# Patient Record
Sex: FEMALE | Race: WHITE | Employment: FULL TIME | URBAN - METROPOLITAN AREA
[De-identification: names, ages, dates, MRNs, and addresses within clinical notes are randomized per-mention and may not be internally consistent; named-entity substitution may affect disease eponyms.]

---

## 2020-04-16 ENCOUNTER — HOSPITAL ENCOUNTER (EMERGENCY)
Age: 30
Discharge: HOME | End: 2020-04-16
Payer: MEDICAID

## 2020-04-16 ENCOUNTER — APPOINTMENT (OUTPATIENT)
Dept: CT IMAGING | Age: 30
End: 2020-04-16
Payer: MEDICAID

## 2020-04-16 ENCOUNTER — APPOINTMENT (OUTPATIENT)
Dept: GENERAL RADIOLOGY | Age: 30
End: 2020-04-16
Payer: MEDICAID

## 2020-04-16 VITALS
DIASTOLIC BLOOD PRESSURE: 75 MMHG | OXYGEN SATURATION: 100 % | TEMPERATURE: 98.2 F | WEIGHT: 134 LBS | SYSTOLIC BLOOD PRESSURE: 95 MMHG | BODY MASS INDEX: 27.01 KG/M2 | HEIGHT: 59 IN | RESPIRATION RATE: 17 BRPM | HEART RATE: 69 BPM

## 2020-04-16 LAB
A/G RATIO: 1.5 (ref 1.1–2.2)
ALBUMIN SERPL-MCNC: 4.4 G/DL (ref 3.4–5)
ALP BLD-CCNC: 62 U/L (ref 40–129)
ALT SERPL-CCNC: 13 U/L (ref 10–40)
ANION GAP SERPL CALCULATED.3IONS-SCNC: 12 MMOL/L (ref 3–16)
AST SERPL-CCNC: 15 U/L (ref 15–37)
BILIRUB SERPL-MCNC: 0.3 MG/DL (ref 0–1)
BUN BLDV-MCNC: 11 MG/DL (ref 7–20)
CALCIUM SERPL-MCNC: 9.7 MG/DL (ref 8.3–10.6)
CHLORIDE BLD-SCNC: 102 MMOL/L (ref 99–110)
CO2: 25 MMOL/L (ref 21–32)
CREAT SERPL-MCNC: <0.5 MG/DL (ref 0.6–1.1)
GFR AFRICAN AMERICAN: >60
GFR NON-AFRICAN AMERICAN: >60
GLOBULIN: 2.9 G/DL
GLUCOSE BLD-MCNC: 88 MG/DL (ref 70–99)
HCG QUALITATIVE: NEGATIVE
HCT VFR BLD CALC: 41.1 % (ref 36–48)
HEMOGLOBIN: 14.1 G/DL (ref 12–16)
MCH RBC QN AUTO: 31.2 PG (ref 26–34)
MCHC RBC AUTO-ENTMCNC: 34.2 G/DL (ref 31–36)
MCV RBC AUTO: 91.2 FL (ref 80–100)
PDW BLD-RTO: 13.6 % (ref 12.4–15.4)
PLATELET # BLD: 346 K/UL (ref 135–450)
PMV BLD AUTO: 7.5 FL (ref 5–10.5)
POTASSIUM SERPL-SCNC: 3.7 MMOL/L (ref 3.5–5.1)
RAPID INFLUENZA  B AGN: NEGATIVE
RAPID INFLUENZA A AGN: NEGATIVE
RBC # BLD: 4.51 M/UL (ref 4–5.2)
SODIUM BLD-SCNC: 139 MMOL/L (ref 136–145)
TOTAL PROTEIN: 7.3 G/DL (ref 6.4–8.2)
TROPONIN: <0.01 NG/ML
WBC # BLD: 5.3 K/UL (ref 4–11)

## 2020-04-16 PROCEDURE — 87804 INFLUENZA ASSAY W/OPTIC: CPT

## 2020-04-16 PROCEDURE — 84703 CHORIONIC GONADOTROPIN ASSAY: CPT

## 2020-04-16 PROCEDURE — 2580000003 HC RX 258: Performed by: NURSE PRACTITIONER

## 2020-04-16 PROCEDURE — 85027 COMPLETE CBC AUTOMATED: CPT

## 2020-04-16 PROCEDURE — 70450 CT HEAD/BRAIN W/O DYE: CPT

## 2020-04-16 PROCEDURE — 96375 TX/PRO/DX INJ NEW DRUG ADDON: CPT

## 2020-04-16 PROCEDURE — 80053 COMPREHEN METABOLIC PANEL: CPT

## 2020-04-16 PROCEDURE — 96374 THER/PROPH/DIAG INJ IV PUSH: CPT

## 2020-04-16 PROCEDURE — 93005 ELECTROCARDIOGRAM TRACING: CPT | Performed by: NURSE PRACTITIONER

## 2020-04-16 PROCEDURE — 6360000002 HC RX W HCPCS: Performed by: NURSE PRACTITIONER

## 2020-04-16 PROCEDURE — 84484 ASSAY OF TROPONIN QUANT: CPT

## 2020-04-16 PROCEDURE — 71045 X-RAY EXAM CHEST 1 VIEW: CPT

## 2020-04-16 PROCEDURE — 99284 EMERGENCY DEPT VISIT MOD MDM: CPT

## 2020-04-16 RX ORDER — METOCLOPRAMIDE HYDROCHLORIDE 5 MG/ML
10 INJECTION INTRAMUSCULAR; INTRAVENOUS ONCE
Status: COMPLETED | OUTPATIENT
Start: 2020-04-16 | End: 2020-04-16

## 2020-04-16 RX ORDER — KETOROLAC TROMETHAMINE 30 MG/ML
15 INJECTION, SOLUTION INTRAMUSCULAR; INTRAVENOUS ONCE
Status: COMPLETED | OUTPATIENT
Start: 2020-04-16 | End: 2020-04-16

## 2020-04-16 RX ORDER — ONDANSETRON 4 MG/1
4 TABLET, ORALLY DISINTEGRATING ORAL EVERY 8 HOURS PRN
Qty: 20 TABLET | Refills: 0 | Status: SHIPPED | OUTPATIENT
Start: 2020-04-16

## 2020-04-16 RX ORDER — FLUTICASONE PROPIONATE 50 MCG
1 SPRAY, SUSPENSION (ML) NASAL DAILY
Qty: 1 BOTTLE | Refills: 0 | Status: SHIPPED | OUTPATIENT
Start: 2020-04-16 | End: 2021-04-11

## 2020-04-16 RX ORDER — DIPHENHYDRAMINE HYDROCHLORIDE 50 MG/ML
12.5 INJECTION INTRAMUSCULAR; INTRAVENOUS ONCE
Status: COMPLETED | OUTPATIENT
Start: 2020-04-16 | End: 2020-04-16

## 2020-04-16 RX ORDER — BUTALBITAL, ACETAMINOPHEN AND CAFFEINE 300; 40; 50 MG/1; MG/1; MG/1
1 CAPSULE ORAL EVERY 4 HOURS PRN
Qty: 10 CAPSULE | Refills: 0 | Status: SHIPPED | OUTPATIENT
Start: 2020-04-16 | End: 2020-04-19

## 2020-04-16 RX ORDER — ALBUTEROL SULFATE 90 UG/1
AEROSOL, METERED RESPIRATORY (INHALATION)
Qty: 1 INHALER | Refills: 1 | Status: SHIPPED | OUTPATIENT
Start: 2020-04-16

## 2020-04-16 RX ORDER — 0.9 % SODIUM CHLORIDE 0.9 %
1000 INTRAVENOUS SOLUTION INTRAVENOUS ONCE
Status: COMPLETED | OUTPATIENT
Start: 2020-04-16 | End: 2020-04-16

## 2020-04-16 RX ADMIN — DIPHENHYDRAMINE HYDROCHLORIDE 12.5 MG: 50 INJECTION, SOLUTION INTRAMUSCULAR; INTRAVENOUS at 18:46

## 2020-04-16 RX ADMIN — METOCLOPRAMIDE HYDROCHLORIDE 10 MG: 5 INJECTION INTRAMUSCULAR; INTRAVENOUS at 18:48

## 2020-04-16 RX ADMIN — KETOROLAC TROMETHAMINE 15 MG: 30 INJECTION, SOLUTION INTRAMUSCULAR at 18:44

## 2020-04-16 RX ADMIN — SODIUM CHLORIDE 1000 ML: 9 INJECTION, SOLUTION INTRAVENOUS at 18:49

## 2020-04-16 ASSESSMENT — PAIN SCALES - GENERAL
PAINLEVEL_OUTOF10: 0
PAINLEVEL_OUTOF10: 10
PAINLEVEL_OUTOF10: 10

## 2020-04-16 ASSESSMENT — ENCOUNTER SYMPTOMS
NAUSEA: 1
SINUS PRESSURE: 1
ABDOMINAL PAIN: 0
SINUS PAIN: 1
SHORTNESS OF BREATH: 0
ABDOMINAL DISTENTION: 0
ALLERGIC/IMMUNOLOGIC NEGATIVE: 1
DIARRHEA: 0
VOMITING: 1
BACK PAIN: 0
WHEEZING: 0
CONSTIPATION: 0
PHOTOPHOBIA: 1
COUGH: 1

## 2020-04-16 ASSESSMENT — PAIN DESCRIPTION - LOCATION
LOCATION: HEAD
LOCATION: HEAD

## 2020-04-16 ASSESSMENT — PAIN DESCRIPTION - PAIN TYPE
TYPE: ACUTE PAIN
TYPE: ACUTE PAIN

## 2020-04-16 NOTE — ED PROVIDER NOTES
**[facility]  [facility]  EMERGENCY DEPARTMENT ENCOUNTER      Pt Name: Ara Peng  IFT:[id number]  [place] 1990  Date of evaluation: 4/16/2020  Provider: INDIO Henderson CNP      Chief Complaint:    Chief Complaint   Patient presents with    Headache     10 out of 10 \"feels like a vice\". Ibuprofen yesterday with no relief. No Hx of migraines. Eye pain and light sensitivity       Nursing Notes, Past Medical Hx, Past Surgical Hx, Social Hx, Allergies, and Family Hx were all reviewed and agreed with or any disagreements were addressed in the HPI.    HPI:  (Location, Duration, Timing, Severity, Quality, Assoc Sx, Context, Modifying factors)  This is a  31 y.o. female who presents with 3 days of headache that feels like a vice states she has also been having left sided sinus pain and pressure, light sensitivity, nausea and vomiting. States the sinus pain and pressure has been for a couple of weeks. States no fevers, neck pain, IV drug use, abdominal pain, trouble with ambulation,  or weakness. States she has had some chest discomfort but denies any of that right now. Rates headache a 10/10. States dry cough for over a month. States she is a smoker. PastMedical/Surgical History:      Diagnosis Date    Anxiety     Depression          Procedure Laterality Date    EYE SURGERY         Medications:  Previous Medications    No medications on file         Review of Systems:  Review of Systems   Constitutional: Negative for activity change, appetite change, chills, diaphoresis, fatigue and fever. HENT: Positive for sinus pressure and sinus pain. Eyes: Positive for photophobia. Respiratory: Positive for cough. Negative for shortness of breath and wheezing. Cardiovascular: Positive for chest pain. Negative for palpitations and leg swelling. Gastrointestinal: Positive for nausea and vomiting.  Negative for abdominal distention, abdominal pain, constipation and diarrhea. Endocrine: Negative. Genitourinary: Negative for difficulty urinating, dysuria and flank pain. Musculoskeletal: Negative for back pain, neck pain and neck stiffness. Skin: Negative. Allergic/Immunologic: Negative. Neurological: Positive for headaches. Negative for dizziness, seizures, syncope, speech difficulty, weakness, light-headedness and numbness. Hematological: Negative. Psychiatric/Behavioral: Negative. Positives and Pertinent negatives as per HPI. Except as noted above in the ROS, problem specific ROS was completed and is negative. Physical Exam:  Physical Exam  Constitutional:       General: She is not in acute distress. Appearance: Normal appearance. She is normal weight. She is not ill-appearing, toxic-appearing or diaphoretic. HENT:      Head: Normocephalic and atraumatic. Right Ear: A middle ear effusion is present. Left Ear: Tympanic membrane normal.      Nose:      Left Sinus: Maxillary sinus tenderness present. Mouth/Throat:      Mouth: Mucous membranes are moist.      Pharynx: Oropharynx is clear. No oropharyngeal exudate or posterior oropharyngeal erythema. Eyes:      Extraocular Movements: Extraocular movements intact. Conjunctiva/sclera: Conjunctivae normal.      Pupils: Pupils are equal, round, and reactive to light. Neck:      Musculoskeletal: Normal range of motion and neck supple. No muscular tenderness. Cardiovascular:      Rate and Rhythm: Normal rate and regular rhythm. Pulses: Normal pulses. Heart sounds: Normal heart sounds. No murmur. No friction rub. No gallop. Pulmonary:      Effort: Pulmonary effort is normal. No respiratory distress. Breath sounds: Normal breath sounds. No stridor. No wheezing, rhonchi or rales. Chest:      Chest wall: No tenderness. Abdominal:      General: Abdomen is flat. Bowel sounds are normal. There is no distension.       Tenderness: with fioricet, zofran and flonase  Patient also given instructions to self-quarantine    The patient tolerated their visit well. I evaluated the patient. The physician was available for consultation as needed. The patient and / or the family were informed of the results of any tests, a time was given to answer questions, a plan was proposed and they agreed with plan. CLINICAL IMPRESSION:  1. Acute nonintractable headache, unspecified headache type    2. Non-intractable vomiting with nausea, unspecified vomiting type    3. Middle ear effusion, right    4. Chest discomfort    5. Cough        DISPOSITION        [facility]:  [facility]  [phone]  Schedule an appointment as soon as possible for a visit in 3 days  For recheck    [facility]  ED  [address]  Go to   For new or worsening symptoms      DISCHARGE MEDICATIONS:  New Prescriptions    ALBUTEROL SULFATE HFA (PROAIR HFA) 108 (90 BASE) MCG/ACT INHALER    Use 2 puffs every 4 hours while awake for PRN cough/wheezing  Dispense with SPACER and Instruct on use. May sub Ventolin or Proventil as needed per [employer]. BUTALBITAL-APAP-CAFFEINE (FIORICET) -40 MG CAPS PER CAPSULE    Take 1 capsule by mouth every 4 hours as needed for Headaches    FLUTICASONE (FLONASE) 50 MCG/ACT NASAL SPRAY    1 spray by Nasal route daily    ONDANSETRON (ZOFRAN ODT) 4 MG DISINTEGRATING TABLET    Take 1 tablet by mouth every 8 hours as needed for Nausea       DISCONTINUED MEDICATIONS:  Discontinued Medications    ACETAMINOPHEN-CODEINE (TYLENOL/CODEINE #3) 300-30 MG PER TABLET    Take 1 tablet by mouth every 4 hours as needed for Pain.               (Please note the MDM and HPI sections of this note were completed with a voice recognition program.  Efforts were made to edit the dictations but occasionally words are mis-transcribed.)    Electronically signed, Queta Sim, INDIO - MANOHAR, Dolores Willett, INDIO - CNP  04/16/20 1954

## 2020-04-17 LAB
EKG ATRIAL RATE: 56 BPM
EKG DIAGNOSIS: NORMAL
EKG P AXIS: 37 DEGREES
EKG P-R INTERVAL: 164 MS
EKG Q-T INTERVAL: 452 MS
EKG QRS DURATION: 88 MS
EKG QTC CALCULATION (BAZETT): 436 MS
EKG R AXIS: 77 DEGREES
EKG T AXIS: 55 DEGREES
EKG VENTRICULAR RATE: 56 BPM

## 2020-04-17 PROCEDURE — 93010 ELECTROCARDIOGRAM REPORT: CPT | Performed by: INTERNAL MEDICINE

## 2023-03-19 ENCOUNTER — HOSPITAL ENCOUNTER (EMERGENCY)
Age: 33
Discharge: HOME | End: 2023-03-19

## 2023-03-19 ENCOUNTER — APPOINTMENT (OUTPATIENT)
Dept: GENERAL RADIOLOGY | Age: 33
End: 2023-03-19

## 2023-03-19 VITALS
TEMPERATURE: 97.8 F | HEART RATE: 80 BPM | BODY MASS INDEX: 25 KG/M2 | SYSTOLIC BLOOD PRESSURE: 128 MMHG | RESPIRATION RATE: 16 BRPM | DIASTOLIC BLOOD PRESSURE: 82 MMHG | HEIGHT: 59 IN | WEIGHT: 124 LBS | OXYGEN SATURATION: 100 %

## 2023-03-19 DIAGNOSIS — T30.4 CHEMICAL BURN: Primary | ICD-10-CM

## 2023-03-19 DIAGNOSIS — E87.6 HYPOKALEMIA: ICD-10-CM

## 2023-03-19 DIAGNOSIS — J40 BRONCHITIS: ICD-10-CM

## 2023-03-19 DIAGNOSIS — Z87.891 SMOKING HX: ICD-10-CM

## 2023-03-19 LAB
ALBUMIN SERPL-MCNC: 4.8 G/DL (ref 3.4–5)
ALBUMIN/GLOB SERPL: 1.7 {RATIO} (ref 1.1–2.2)
ALP SERPL-CCNC: 63 U/L (ref 40–129)
ALT SERPL-CCNC: 15 U/L (ref 10–40)
AMPHETAMINES UR QL SCN>1000 NG/ML: ABNORMAL
ANION GAP SERPL CALCULATED.3IONS-SCNC: 15 MMOL/L (ref 3–16)
AST SERPL-CCNC: 22 U/L (ref 15–37)
BACTERIA URNS QL MICRO: ABNORMAL /HPF
BARBITURATES UR QL SCN>200 NG/ML: ABNORMAL
BASOPHILS # BLD: 0 K/UL (ref 0–0.2)
BASOPHILS NFR BLD: 0.5 %
BENZODIAZ UR QL SCN>200 NG/ML: ABNORMAL
BILIRUB SERPL-MCNC: 1 MG/DL (ref 0–1)
BILIRUB UR QL STRIP.AUTO: ABNORMAL
BUN SERPL-MCNC: 6 MG/DL (ref 7–20)
CALCIUM SERPL-MCNC: 9.4 MG/DL (ref 8.3–10.6)
CANNABINOIDS UR QL SCN>50 NG/ML: POSITIVE
CHLORIDE SERPL-SCNC: 99 MMOL/L (ref 99–110)
CLARITY UR: CLEAR
CO2 SERPL-SCNC: 19 MMOL/L (ref 21–32)
COCAINE UR QL SCN: ABNORMAL
COLOR UR: YELLOW
CREAT SERPL-MCNC: <0.5 MG/DL (ref 0.6–1.1)
DEPRECATED RDW RBC AUTO: 12.5 % (ref 12.4–15.4)
DRUG SCREEN COMMENT UR-IMP: ABNORMAL
EOSINOPHIL # BLD: 0 K/UL (ref 0–0.6)
EOSINOPHIL NFR BLD: 0.2 %
EPI CELLS #/AREA URNS HPF: ABNORMAL /HPF (ref 0–5)
FENTANYL SCREEN, URINE: ABNORMAL
GFR SERPLBLD CREATININE-BSD FMLA CKD-EPI: >60 ML/MIN/{1.73_M2}
GLUCOSE SERPL-MCNC: 101 MG/DL (ref 70–99)
GLUCOSE UR STRIP.AUTO-MCNC: NEGATIVE MG/DL
HCT VFR BLD AUTO: 41.4 % (ref 36–48)
HGB BLD-MCNC: 13.8 G/DL (ref 12–16)
HGB UR QL STRIP.AUTO: ABNORMAL
KETONES UR STRIP.AUTO-MCNC: >=80 MG/DL
LEUKOCYTE ESTERASE UR QL STRIP.AUTO: NEGATIVE
LYMPHOCYTES # BLD: 1.6 K/UL (ref 1–5.1)
LYMPHOCYTES NFR BLD: 21.1 %
MAGNESIUM SERPL-MCNC: 2.3 MG/DL (ref 1.8–2.4)
MCH RBC QN AUTO: 30.7 PG (ref 26–34)
MCHC RBC AUTO-ENTMCNC: 33.4 G/DL (ref 31–36)
MCV RBC AUTO: 91.8 FL (ref 80–100)
METHADONE UR QL SCN>300 NG/ML: ABNORMAL
MONOCYTES # BLD: 0.6 K/UL (ref 0–1.3)
MONOCYTES NFR BLD: 7.5 %
MUCOUS THREADS #/AREA URNS LPF: ABNORMAL /LPF
NEUTROPHILS # BLD: 5.2 K/UL (ref 1.7–7.7)
NEUTROPHILS NFR BLD: 70.7 %
NITRITE UR QL STRIP.AUTO: NEGATIVE
NT-PROBNP SERPL-MCNC: 57 PG/ML (ref 0–124)
OPIATES UR QL SCN>300 NG/ML: ABNORMAL
OXYCODONE UR QL SCN: ABNORMAL
PCP UR QL SCN>25 NG/ML: ABNORMAL
PH UR STRIP.AUTO: 6 [PH] (ref 5–8)
PH UR STRIP: 6 [PH]
PLATELET # BLD AUTO: 333 K/UL (ref 135–450)
PMV BLD AUTO: 7.7 FL (ref 5–10.5)
POTASSIUM SERPL-SCNC: 3.2 MMOL/L (ref 3.5–5.1)
PROT SERPL-MCNC: 7.7 G/DL (ref 6.4–8.2)
PROT UR STRIP.AUTO-MCNC: NEGATIVE MG/DL
RBC # BLD AUTO: 4.51 M/UL (ref 4–5.2)
RBC #/AREA URNS HPF: ABNORMAL /HPF (ref 0–4)
SODIUM SERPL-SCNC: 133 MMOL/L (ref 136–145)
SP GR UR STRIP.AUTO: >=1.03 (ref 1–1.03)
TROPONIN T SERPL-MCNC: <0.01 NG/ML
UA COMPLETE W REFLEX CULTURE PNL UR: ABNORMAL
UA DIPSTICK W REFLEX MICRO PNL UR: YES
URN SPEC COLLECT METH UR: ABNORMAL
UROBILINOGEN UR STRIP-ACNC: 0.2 E.U./DL
WBC # BLD AUTO: 7.4 K/UL (ref 4–11)
WBC #/AREA URNS HPF: ABNORMAL /HPF (ref 0–5)

## 2023-03-19 PROCEDURE — 71046 X-RAY EXAM CHEST 2 VIEWS: CPT

## 2023-03-19 PROCEDURE — 99285 EMERGENCY DEPT VISIT HI MDM: CPT

## 2023-03-19 PROCEDURE — 85025 COMPLETE CBC W/AUTO DIFF WBC: CPT

## 2023-03-19 PROCEDURE — 81001 URINALYSIS AUTO W/SCOPE: CPT

## 2023-03-19 PROCEDURE — 36415 COLL VENOUS BLD VENIPUNCTURE: CPT

## 2023-03-19 PROCEDURE — 80053 COMPREHEN METABOLIC PANEL: CPT

## 2023-03-19 PROCEDURE — 84484 ASSAY OF TROPONIN QUANT: CPT

## 2023-03-19 PROCEDURE — 83880 ASSAY OF NATRIURETIC PEPTIDE: CPT

## 2023-03-19 PROCEDURE — 93005 ELECTROCARDIOGRAM TRACING: CPT | Performed by: NURSE PRACTITIONER

## 2023-03-19 PROCEDURE — 6370000000 HC RX 637 (ALT 250 FOR IP): Performed by: NURSE PRACTITIONER

## 2023-03-19 PROCEDURE — 83735 ASSAY OF MAGNESIUM: CPT

## 2023-03-19 PROCEDURE — 80307 DRUG TEST PRSMV CHEM ANLYZR: CPT

## 2023-03-19 RX ORDER — BENZONATATE 100 MG/1
100 CAPSULE ORAL ONCE
Status: COMPLETED | OUTPATIENT
Start: 2023-03-19 | End: 2023-03-19

## 2023-03-19 RX ORDER — PREDNISONE 20 MG/1
TABLET ORAL
Qty: 18 TABLET | Refills: 0 | Status: SHIPPED | OUTPATIENT
Start: 2023-03-19

## 2023-03-19 RX ORDER — ALBUTEROL SULFATE 90 UG/1
2 AEROSOL, METERED RESPIRATORY (INHALATION) 4 TIMES DAILY PRN
Qty: 18 G | Refills: 0 | Status: SHIPPED | OUTPATIENT
Start: 2023-03-19

## 2023-03-19 RX ORDER — BENZONATATE 100 MG/1
100 CAPSULE ORAL 3 TIMES DAILY PRN
Qty: 30 CAPSULE | Refills: 0 | Status: SHIPPED | OUTPATIENT
Start: 2023-03-19 | End: 2023-03-26

## 2023-03-19 RX ORDER — PREDNISONE 20 MG/1
60 TABLET ORAL ONCE
Status: COMPLETED | OUTPATIENT
Start: 2023-03-19 | End: 2023-03-19

## 2023-03-19 RX ADMIN — BENZONATATE 100 MG: 100 CAPSULE ORAL at 15:43

## 2023-03-19 RX ADMIN — POTASSIUM BICARBONATE 40 MEQ: 782 TABLET, EFFERVESCENT ORAL at 15:43

## 2023-03-19 RX ADMIN — PREDNISONE 60 MG: 20 TABLET ORAL at 15:43

## 2023-03-19 ASSESSMENT — LIFESTYLE VARIABLES
HOW OFTEN DO YOU HAVE A DRINK CONTAINING ALCOHOL: NEVER
HOW MANY STANDARD DRINKS CONTAINING ALCOHOL DO YOU HAVE ON A TYPICAL DAY: PATIENT DOES NOT DRINK

## 2023-03-19 ASSESSMENT — ENCOUNTER SYMPTOMS
CHOKING: 1
ABDOMINAL PAIN: 0
COLOR CHANGE: 0
RHINORRHEA: 0
FACIAL SWELLING: 0
SHORTNESS OF BREATH: 1
SORE THROAT: 0

## 2023-03-19 ASSESSMENT — HEART SCORE: ECG: 0

## 2023-03-19 ASSESSMENT — PAIN DESCRIPTION - DESCRIPTORS: DESCRIPTORS: ACHING

## 2023-03-19 ASSESSMENT — PAIN DESCRIPTION - LOCATION: LOCATION: BACK

## 2023-03-19 ASSESSMENT — PAIN DESCRIPTION - ORIENTATION: ORIENTATION: LOWER

## 2023-03-19 ASSESSMENT — PAIN - FUNCTIONAL ASSESSMENT: PAIN_FUNCTIONAL_ASSESSMENT: 0-10

## 2023-03-19 ASSESSMENT — PAIN SCALES - GENERAL: PAINLEVEL_OUTOF10: 9

## 2023-03-19 NOTE — ED PROVIDER NOTES
[address] ED  EMERGENCY DEPARTMENT ENCOUNTER      I am the Primary Clinician of Record    Note started: 5:42 PM EDT 3/19/23    VALERIE. I have evaluated this patient. My supervising physician was available for consultation. Pt Name: Gian Bobby  MRN: [id number]  [place] 1990  Dateof evaluation: 3/19/2023  Provider: INDIO Campbell CNP  PCP: None None  ED Attending: No att. providers found      [address]       Chief Complaint   Patient presents with    Facial Injury     Was bleaching hair and got bleach on face around eyes. Flushed with water for several minutes while waiting for EMS. Dysuria     Lower back pain and difficulty urinating after drinking caffeine. Patient thinks it may be a UTI. Chest Pain     Chest pain every time she smokes a cigarette. HISTORY OF PRESENTILLNESS   (Location/Symptom, Timing/Onset, Context/Setting, Quality, Duration, Modifying Factors, Severity)  Note limiting factors. Gian Bobby is a 34 y.o. female for multiple complaints. Onset was today. Context includes patient reports that she was using bleach on her hair today when she got some of it on her face. Patient reports that she is continuing to have pain to her skin despite washing the area. Patient also complains of chest pain that has been going on for months. She also states that she is having some dysuria especially when she drinks caffeine. Alleviating factors include nothing. Aggravating factors include nothing. Pain is 9/10. Nothing has been used for pain today. Nursing Notes were all reviewed and agreed with or any disagreements were addressed  in the HPI. REVIEW OF SYSTEMS       Review of Systems   Constitutional:  Negative for activity change, appetite change and fever. HENT:  Negative for congestion, facial swelling, rhinorrhea and sore throat. Eyes:  Negative for visual disturbance.    Respiratory:  Positive for choking and

## 2023-03-20 LAB
EKG ATRIAL RATE: 82 BPM
EKG DIAGNOSIS: NORMAL
EKG P AXIS: 32 DEGREES
EKG P-R INTERVAL: 140 MS
EKG Q-T INTERVAL: 360 MS
EKG QRS DURATION: 68 MS
EKG QTC CALCULATION (BAZETT): 420 MS
EKG R AXIS: 67 DEGREES
EKG T AXIS: 47 DEGREES
EKG VENTRICULAR RATE: 82 BPM

## 2023-03-20 PROCEDURE — 93010 ELECTROCARDIOGRAM REPORT: CPT | Performed by: INTERNAL MEDICINE

## 2023-03-26 ENCOUNTER — APPOINTMENT (OUTPATIENT)
Dept: CT IMAGING | Age: 33
DRG: 754 | End: 2023-03-26
Payer: MEDICAID

## 2023-03-26 ENCOUNTER — HOSPITAL ENCOUNTER (INPATIENT)
Age: 33
LOS: 8 days | Discharge: HOME | DRG: 754 | End: 2023-04-04
Attending: EMERGENCY MEDICINE | Admitting: PSYCHIATRY & NEUROLOGY
Payer: MEDICAID

## 2023-03-26 DIAGNOSIS — R41.82 ALTERED MENTAL STATUS, UNSPECIFIED ALTERED MENTAL STATUS TYPE: Primary | ICD-10-CM

## 2023-03-26 DIAGNOSIS — R45.1 AGITATION REQUIRING SEDATION PROTOCOL: ICD-10-CM

## 2023-03-26 LAB
ALBUMIN SERPL-MCNC: 4.3 G/DL (ref 3.4–5)
ALBUMIN/GLOB SERPL: 2.3 {RATIO} (ref 1.1–2.2)
ALP SERPL-CCNC: 53 U/L (ref 40–129)
ALT SERPL-CCNC: 15 U/L (ref 10–40)
AMPHETAMINES UR QL SCN>1000 NG/ML: ABNORMAL
ANION GAP SERPL CALCULATED.3IONS-SCNC: 11 MMOL/L (ref 3–16)
APAP SERPL-MCNC: <5 UG/ML (ref 10–30)
AST SERPL-CCNC: 15 U/L (ref 15–37)
BARBITURATES UR QL SCN>200 NG/ML: ABNORMAL
BASOPHILS # BLD: 0.1 K/UL (ref 0–0.2)
BASOPHILS NFR BLD: 0.8 %
BENZODIAZ UR QL SCN>200 NG/ML: ABNORMAL
BILIRUB SERPL-MCNC: 0.3 MG/DL (ref 0–1)
BUN SERPL-MCNC: 8 MG/DL (ref 7–20)
CALCIUM SERPL-MCNC: 9.1 MG/DL (ref 8.3–10.6)
CANNABINOIDS UR QL SCN>50 NG/ML: POSITIVE
CHLORIDE SERPL-SCNC: 103 MMOL/L (ref 99–110)
CO2 SERPL-SCNC: 25 MMOL/L (ref 21–32)
COCAINE UR QL SCN: ABNORMAL
CREAT SERPL-MCNC: <0.5 MG/DL (ref 0.6–1.1)
DEPRECATED RDW RBC AUTO: 12.5 % (ref 12.4–15.4)
DRUG SCREEN COMMENT UR-IMP: ABNORMAL
EOSINOPHIL # BLD: 0 K/UL (ref 0–0.6)
EOSINOPHIL NFR BLD: 0.1 %
ETHANOLAMINE SERPL-MCNC: NORMAL MG/DL (ref 0–0.08)
FENTANYL SCREEN, URINE: ABNORMAL
GFR SERPLBLD CREATININE-BSD FMLA CKD-EPI: >60 ML/MIN/{1.73_M2}
GLUCOSE SERPL-MCNC: 89 MG/DL (ref 70–99)
HCG SERPL QL: NEGATIVE
HCT VFR BLD AUTO: 37.1 % (ref 36–48)
HGB BLD-MCNC: 12.8 G/DL (ref 12–16)
LYMPHOCYTES # BLD: 2 K/UL (ref 1–5.1)
LYMPHOCYTES NFR BLD: 21.8 %
MAGNESIUM SERPL-MCNC: 2 MG/DL (ref 1.8–2.4)
MCH RBC QN AUTO: 30.9 PG (ref 26–34)
MCHC RBC AUTO-ENTMCNC: 34.4 G/DL (ref 31–36)
MCV RBC AUTO: 89.8 FL (ref 80–100)
METHADONE UR QL SCN>300 NG/ML: ABNORMAL
MONOCYTES # BLD: 0.7 K/UL (ref 0–1.3)
MONOCYTES NFR BLD: 7.4 %
NEUTROPHILS # BLD: 6.3 K/UL (ref 1.7–7.7)
NEUTROPHILS NFR BLD: 69.9 %
OPIATES UR QL SCN>300 NG/ML: ABNORMAL
OXYCODONE UR QL SCN: ABNORMAL
PCP UR QL SCN>25 NG/ML: ABNORMAL
PH UR STRIP: 7 [PH]
PLATELET # BLD AUTO: 345 K/UL (ref 135–450)
PMV BLD AUTO: 7.9 FL (ref 5–10.5)
POTASSIUM SERPL-SCNC: 2.9 MMOL/L (ref 3.5–5.1)
PROT SERPL-MCNC: 6.2 G/DL (ref 6.4–8.2)
RBC # BLD AUTO: 4.13 M/UL (ref 4–5.2)
SALICYLATES SERPL-MCNC: <0.3 MG/DL (ref 15–30)
SODIUM SERPL-SCNC: 139 MMOL/L (ref 136–145)
WBC # BLD AUTO: 9 K/UL (ref 4–11)

## 2023-03-26 PROCEDURE — 84703 CHORIONIC GONADOTROPIN ASSAY: CPT

## 2023-03-26 PROCEDURE — 99285 EMERGENCY DEPT VISIT HI MDM: CPT

## 2023-03-26 PROCEDURE — 80143 DRUG ASSAY ACETAMINOPHEN: CPT

## 2023-03-26 PROCEDURE — 80307 DRUG TEST PRSMV CHEM ANLYZR: CPT

## 2023-03-26 PROCEDURE — 6370000000 HC RX 637 (ALT 250 FOR IP): Performed by: EMERGENCY MEDICINE

## 2023-03-26 PROCEDURE — 70450 CT HEAD/BRAIN W/O DYE: CPT

## 2023-03-26 PROCEDURE — 80179 DRUG ASSAY SALICYLATE: CPT

## 2023-03-26 PROCEDURE — 83735 ASSAY OF MAGNESIUM: CPT

## 2023-03-26 PROCEDURE — 83036 HEMOGLOBIN GLYCOSYLATED A1C: CPT

## 2023-03-26 PROCEDURE — 84443 ASSAY THYROID STIM HORMONE: CPT

## 2023-03-26 PROCEDURE — 85025 COMPLETE CBC W/AUTO DIFF WBC: CPT

## 2023-03-26 PROCEDURE — 96372 THER/PROPH/DIAG INJ SC/IM: CPT

## 2023-03-26 PROCEDURE — 80053 COMPREHEN METABOLIC PANEL: CPT

## 2023-03-26 PROCEDURE — 82077 ASSAY SPEC XCP UR&BREATH IA: CPT

## 2023-03-26 PROCEDURE — 6360000002 HC RX W HCPCS: Performed by: EMERGENCY MEDICINE

## 2023-03-26 PROCEDURE — 36415 COLL VENOUS BLD VENIPUNCTURE: CPT

## 2023-03-26 PROCEDURE — 80061 LIPID PANEL: CPT

## 2023-03-26 RX ORDER — HALOPERIDOL 5 MG/ML
2 INJECTION INTRAMUSCULAR ONCE
Status: COMPLETED | OUTPATIENT
Start: 2023-03-26 | End: 2023-03-26

## 2023-03-26 RX ORDER — OLANZAPINE 5 MG/1
10 TABLET, ORALLY DISINTEGRATING ORAL ONCE
Status: COMPLETED | OUTPATIENT
Start: 2023-03-26 | End: 2023-03-26

## 2023-03-26 RX ORDER — LORAZEPAM 2 MG/ML
2 INJECTION INTRAMUSCULAR
Status: COMPLETED | OUTPATIENT
Start: 2023-03-26 | End: 2023-03-26

## 2023-03-26 RX ORDER — DIPHENHYDRAMINE HYDROCHLORIDE 50 MG/ML
25 INJECTION INTRAMUSCULAR; INTRAVENOUS ONCE
Status: COMPLETED | OUTPATIENT
Start: 2023-03-26 | End: 2023-03-26

## 2023-03-26 RX ADMIN — DIPHENHYDRAMINE HYDROCHLORIDE 25 MG: 50 INJECTION, SOLUTION INTRAMUSCULAR; INTRAVENOUS at 18:17

## 2023-03-26 RX ADMIN — LORAZEPAM 2 MG: 2 INJECTION INTRAMUSCULAR; INTRAVENOUS at 18:18

## 2023-03-26 RX ADMIN — HALOPERIDOL LACTATE 2 MG: 5 INJECTION, SOLUTION INTRAMUSCULAR at 18:18

## 2023-03-26 RX ADMIN — OLANZAPINE 10 MG: 5 TABLET, ORALLY DISINTEGRATING ORAL at 17:39

## 2023-03-26 ASSESSMENT — PAIN - FUNCTIONAL ASSESSMENT: PAIN_FUNCTIONAL_ASSESSMENT: NONE - DENIES PAIN

## 2023-03-27 PROBLEM — F29 PSYCHOSIS, UNSPECIFIED PSYCHOSIS TYPE (HCC): Status: ACTIVE | Noted: 2023-03-27

## 2023-03-27 LAB
FLUAV RNA RESP QL NAA+PROBE: NOT DETECTED
FLUBV RNA RESP QL NAA+PROBE: NOT DETECTED
SARS-COV-2 RNA RESP QL NAA+PROBE: NOT DETECTED
TSH SERPL DL<=0.005 MIU/L-ACNC: 0.56 UIU/ML (ref 0.27–4.2)

## 2023-03-27 PROCEDURE — 6370000000 HC RX 637 (ALT 250 FOR IP)

## 2023-03-27 PROCEDURE — 6370000000 HC RX 637 (ALT 250 FOR IP): Performed by: PHYSICIAN ASSISTANT

## 2023-03-27 PROCEDURE — 1240000000 HC EMOTIONAL WELLNESS R&B

## 2023-03-27 PROCEDURE — 87636 SARSCOV2 & INF A&B AMP PRB: CPT

## 2023-03-27 RX ORDER — OLANZAPINE 5 MG/1
5 TABLET ORAL EVERY 4 HOURS PRN
Status: DISCONTINUED | OUTPATIENT
Start: 2023-03-27 | End: 2023-04-04 | Stop reason: HOSPADM

## 2023-03-27 RX ORDER — ALBUTEROL SULFATE 90 UG/1
2 AEROSOL, METERED RESPIRATORY (INHALATION) 4 TIMES DAILY PRN
Status: DISCONTINUED | OUTPATIENT
Start: 2023-03-27 | End: 2023-04-04 | Stop reason: HOSPADM

## 2023-03-27 RX ORDER — ACETAMINOPHEN 325 MG/1
650 TABLET ORAL EVERY 4 HOURS PRN
Status: DISCONTINUED | OUTPATIENT
Start: 2023-03-27 | End: 2023-04-04 | Stop reason: HOSPADM

## 2023-03-27 RX ORDER — POLYETHYLENE GLYCOL 3350 17 G
2 POWDER IN PACKET (EA) ORAL
Status: DISCONTINUED | OUTPATIENT
Start: 2023-03-27 | End: 2023-04-04 | Stop reason: HOSPADM

## 2023-03-27 RX ORDER — HYDROXYZINE 50 MG/1
50 TABLET, FILM COATED ORAL 3 TIMES DAILY PRN
Status: DISCONTINUED | OUTPATIENT
Start: 2023-03-27 | End: 2023-04-04 | Stop reason: HOSPADM

## 2023-03-27 RX ORDER — DIPHENHYDRAMINE HYDROCHLORIDE 50 MG/ML
50 INJECTION INTRAMUSCULAR; INTRAVENOUS EVERY 4 HOURS PRN
Status: DISCONTINUED | OUTPATIENT
Start: 2023-03-27 | End: 2023-04-04 | Stop reason: HOSPADM

## 2023-03-27 RX ORDER — POTASSIUM CHLORIDE 20 MEQ/1
40 TABLET, EXTENDED RELEASE ORAL ONCE
Status: COMPLETED | OUTPATIENT
Start: 2023-03-27 | End: 2023-03-27

## 2023-03-27 RX ORDER — MAGNESIUM HYDROXIDE/ALUMINUM HYDROXICE/SIMETHICONE 120; 1200; 1200 MG/30ML; MG/30ML; MG/30ML
30 SUSPENSION ORAL EVERY 6 HOURS PRN
Status: DISCONTINUED | OUTPATIENT
Start: 2023-03-27 | End: 2023-04-04 | Stop reason: HOSPADM

## 2023-03-27 RX ORDER — TRAZODONE HYDROCHLORIDE 50 MG/1
50 TABLET ORAL NIGHTLY PRN
Status: DISCONTINUED | OUTPATIENT
Start: 2023-03-27 | End: 2023-04-04 | Stop reason: HOSPADM

## 2023-03-27 RX ADMIN — POTASSIUM CHLORIDE 40 MEQ: 1500 TABLET, EXTENDED RELEASE ORAL at 15:27

## 2023-03-27 ASSESSMENT — PAIN SCALES - GENERAL: PAINLEVEL_OUTOF10: 0

## 2023-03-27 NOTE — ED PROVIDER NOTES
I did not see or assess this patient, I was asked by patient's nurse to place a potassium order, 40 mill EQ p.o. potassium ordered for hypokalemia of 2.9.       [facility] [address] [place] [address]  03/27/23 0006
physical exam, bedside monitoring of interventions, collecting and interpreting tests and discussion with consultants but excluding time spent performing procedures, treating other patients and teaching time. EMERGENCY DEPARTMENT COURSE and DIFFERENTIAL DIAGNOSIS/MDM:     Patient seen and evaluated. At presentation, patient was awake, alert, agitated,was doing abrupt movements with arms and legs, pointing fingers, jumping out of bed, shaking her head. Was agreeable with taking Zyprexa. She was given Zyprexa ODT. As patient reports having auditory hallucinations and voices that tell her to hurt herself, she was placed on a 72 hour hold. However, shortly after taking the Zyprexa, she started running down the andersen. She ended up getting herself into the medication room. Given this, she had to be placed on restraints. She was given Ativan 2 mg IM, Benadryl 25 mg IM, and Haldol 2 mg IM. After she calmed down, blood work obtained. Creatinine is normal.  Potassium low at 2.9. Alcohol negative. Salicylate and sent within negative. pregnancy test is negative. CT head shows no acute intracranial bleed. I have performed a medical clearance examination on this patient. It is my opinion that no medical conditions were discovered that would preclude admission to a behavioral health unit or discharge home. I feel that the patient is medically stable for disposition by the behavioral health team at this time. Psychiatry consulted for evaluation. Patient remained stable during her stay in the emergency department. Awaiting patient to metabolize the medications. CONSULTS: (Who and What was discussed)  None    Is this patient to be included in the SEP-1 Core Measure due to severe sepsis or septic shock?    No   Exclusion criteria - the patient is NOT to be included for SEP-1 Core Measure due to:  2+ SIRS criteria

## 2023-03-27 NOTE — ED NOTES
Attempted to talk/assess patient without success. Patient covers her head up in blanket and goes back to sleep. Will retry.       Michael Mckoy RN  03/27/23 2136
Collateral Contact:  Attempted to call collateral from Marisa Baxter Covert father at [phone], no answer and not accepting voice mail    Attempted to call and get collateral from Kaiden Gao at [phone] and left voice message to return call.       Winfred Kocher, RN  03/27/23 9673
Collateral Contact:  Vicky Partida  HXTYM:[phone]  Relation to Patient: boyfriend  Last Contact with Patient: 3/26/2023 prior to arrival  Concerns: Twyla Jay returned call left last night. He reports that that Maddison Alston has been depressed last couple days. States she has been \"scared\". He did not elaborate about what she is scared of. States symptoms started 3-4 days ago. These symptoms started after she was seen in the ER on 3/19/2023 for chemical burn from bleach. He reports that she had been taking her prednisone after she got them filled on 3/22/2023. Twyla Jay feels that she will be safe to come home and he will look after here.         Tenzin Riggs RN  03/27/23 5369
Patient awoken by Matt Hobbs. Domonique talking with patient.       Arianna Berger RN  03/27/23 1833
Patient continues to be drowsy awakens and falls back to sleep. Will continue to monitor patient.       Jacinda Sotelo RN  03/27/23 3783
Patient continues to sleep in room. Awaiting transport to [facility].       Afua Mills RN  03/27/23 7985
Patient remains asleep. Resp easy and even.        Luis Vanegas RN  03/27/23 5118
Patient resting in bed. Will continue to monitor patient.       Ace Hitchcock RN  03/27/23 6307
Patient resting in bed. Will continue to monitor patient.       Jacinda Sotelo RN  03/27/23 7449
Patient taken to [facility] per w/c.      Joycelyn Gan RN  03/27/23 8901
Pt asleep in room, no signs of distress noted.            Raheel STEPHENS
Pt brought back to LILY. Pt changed into safety gown. Pt oriented to room B3 and LILY process. Pt's belongings placed in locker.            Anne Nissen LSW
Pt continues to sleep. No signs of distress observed. Will continue to monitor.      [address] [place], [place]  03/27/23 6537
Pt gave verbal permission to update pt's boyfriend about admission to [facility].      [address] [place] [address]  03/27/23 9494
Pt laying on side in room, no signs of distress noted.          Lisa STEPHENS
Pt not given much information during triage; but nods yes and no; only at present during triage; pt with odd behavior; moving arms up and down, denies S/I or drug use at this time; provider notifed of pt assessment; provider to room for assessment; pt cooperative at this time      Rey Cordero RN  03/26/23 2306
Pt resting in room, no signs of distress noted.            Hung STEPHENS
Pt. Continues to rest.  Respirations unlabored, pt. Appears to be in no distress. Sitter at bedside.      Michael Stephens RN  03/27/23 5607
Pt. Kayleen Mclaughlin at this time. Remaining restraints removed. Pt. Tolerated well. CT called for scans.      Bernarda Melendez RN  03/26/23 2003
Pt. To CT.      Kiah Cerrato RN  03/26/23 2004
Pt. Unable to become alert enough to take PO meds at this time. Provider aware and OK to hold meds until pt can tolerate PO.      Michael Stephens RN  03/27/23 5600
Report given to Chino Garcia 98, RN  03/27/23 1971
Report given to [facility].       Augusta Barron RN  03/27/23 1673
Resp easy and even. Remains asleep. Attempt to wake patient up to assess was unsuccessful.       Ignacio Arthur RN  03/27/23 0656
[facility] d/c  [phone]     Bernarda Melendez RN  03/26/23 1483
Violent Restraint Face-to-Face Evaluation  (must be completed within one hour of initiation of restraints)      Evaluate immediate situation:  patient was reported to trying to get into med room, being physically aggressive to staff and having erratic behavior towards staff. Staff was unable to calm patient down through talking. Patient reporting to ER physician that she was hearing voices that told her to wash herself with bleach. Patient placed in restraints, given medication and sitter at side for patient protection from self harm as well as harm to other. Reaction to intervention: patient currently calm at this time, was a little resistant when blood was drawn but easily redirected. Medical Condition/Assessment: blood being drawn and CT of head ordered for medical evaluation. Behavioral Condition/Assessment: calm at this point but alert to person only. The patients review of systems, history, medications, and recent labs were reviewed at this time. Continue/Discontinue restraints at this time: Continue restraints but reduce from 4 point to opposites while patient is calm and will reassess within one hour.      One-Hour Review Evaluation Physician Notification:    Provider Notified Dr Damaris Dennison  Date: 3/26/2023 Time: 1930    Physician or Designated One-Hour Reviewer  Matias Rodriguez, [address] [place] [address]  03/26/23 2380
Vital signs obtained and recorded. Patient will not wake up and talk to nurse. Will continue to monitor.       Kerry Bagley RN  03/27/23 4158
Vital signs obtained. Attempted to wake pt for assessment. She remains groggy and unable to stay awake. Will complete assessment when pt is appropriately able to participate.      [address] [place] [address]  03/27/23 1300
While in room with patient, patient began thrashing in bed, hitting bed violently. Patient does not respond to verbal cues, patient does not follow commands. Patient jumped from bed began pushing into family member in room, attempted to redirect patient without success. Patient took off running down the andersen and into open room, security notified patient collapsed into the arms of staff and walked back to room. Patient placed in restraints at this time per provider order.  Charge nurse aware, clinical aware     Shaan Alba RN  03/26/23 2707
Writer introduced self to pt at this time. When speaking with pt., pt. Verbalizes she is Marcos Delgadotings however states she does not remember her birthday, where she is or what year it is. Pt. Appears agitated at this time. Violent 4 point restraints in place, sitter at bedside.      Eliezer Bell RN  03/26/23 9745
patient continues thrashing in bed, attempted to redirect without success.  Patient yelling out-hallucinations     Darylene Cons, RN  03/26/23 0194
>10 MIU/mL Final    Magnesium 03/26/2023 2.00  1.80 - 2.40 mg/dL Final         Patient presentation and results of RN assessment has been discussed with the on call physician. Patient has been released of restraints.          Anika Link RN  03/26/23 2019
psychosis or cognitive dysfunction  [] Physically healthy  [] Has outpatient services in place  [] Compliant with recommended medications  [] Collateral information from. .. supports patient safety   [] Patient is future oriented with plans to. .. Level of Care Disposition: Admit      Patient was seen by ED provider and [facility] staff. The case presented to psychiatric provider on-call ERNESTINE Mayfield. Based on the ED evaluation and information presented to the provider by this writer, it is the recommendation of the on call psychiatric provider that inpatient hospitalization is the least restrictive environment for the patient at this time. The patient will be admitted to the inpatient unit.              [address] [place] [address]  03/27/23 2373

## 2023-03-28 LAB
CHOLEST SERPL-MCNC: 164 MG/DL (ref 0–199)
EST. AVERAGE GLUCOSE BLD GHB EST-MCNC: 102.5 MG/DL
HBA1C MFR BLD: 5.2 %
HDLC SERPL-MCNC: 58 MG/DL (ref 40–60)
LDLC SERPL CALC-MCNC: 93 MG/DL
POTASSIUM SERPL-SCNC: 3.7 MMOL/L (ref 3.5–5.1)
TRIGL SERPL-MCNC: 64 MG/DL (ref 0–150)
VLDLC SERPL CALC-MCNC: 13 MG/DL

## 2023-03-28 PROCEDURE — 6370000000 HC RX 637 (ALT 250 FOR IP)

## 2023-03-28 PROCEDURE — 1240000000 HC EMOTIONAL WELLNESS R&B

## 2023-03-28 PROCEDURE — 84132 ASSAY OF SERUM POTASSIUM: CPT

## 2023-03-28 PROCEDURE — 36415 COLL VENOUS BLD VENIPUNCTURE: CPT

## 2023-03-28 RX ORDER — PALIPERIDONE 3 MG/1
3 TABLET, EXTENDED RELEASE ORAL DAILY
Status: DISCONTINUED | OUTPATIENT
Start: 2023-03-28 | End: 2023-03-29

## 2023-03-28 RX ADMIN — PALIPERIDONE 3 MG: 3 TABLET, EXTENDED RELEASE ORAL at 13:40

## 2023-03-28 ASSESSMENT — SLEEP AND FATIGUE QUESTIONNAIRES
DO YOU HAVE DIFFICULTY SLEEPING: YES
SLEEP PATTERN: DIFFICULTY FALLING ASLEEP
DO YOU USE A SLEEP AID: NO

## 2023-03-28 NOTE — DISCHARGE INSTRUCTIONS
Advanced Directives:  Patient does not have a surrogate decision maker appointed   Name (if yes): N/A Phone Number: N/A  Patient does not have a psychiatric and/ or medical advanced directive or power of . Patient was offered psychiatric and/ or medical advanced directive or power of  information/completion but declined to complete   Why not? N/A    Discharge Planning is Complete. Discharge Date: 4/4/23   Reason for Hospitalization: Inability to care for self  Discharge Diagnosis: Psychosis, unspecified psychosis type [facility])  Discharging to: Shelter     Your instructions: Your physician here was Nicki Foy MD. If you have any questions please call the unit at [phone] for the adult unit or [phone] for [facility]. Please note that we have a patient family advisory [place] that meets the second Wednesday of January and the second Wednesday of July at [address] in [place] at [facility]. Department leadership would love for you to attend to give feedback on what we are doing well and areas in which we can improve our patient care. Please come if you are able and feel free to reach out to [address] [place] [address] at [phone] with any questions. The crisis number for [facility] is [address] ([address] [place] [address] can use this number at any time to access emergency mental health services. Please follow up with your PCP regarding any pending labs. You reported that you are currently experiencing homelessness. We have outlined the following steps so that you can get connected to the services you need for shelter, support, health care and mental health care/substance use disorder treatment. 1) Upon discharge, you are being referred to the [facility] homeless shelter ran by Sriram Cacrees.  They are located at [address], [place], [address] and their phone number is [phone].    2) Call to make: Tender

## 2023-03-28 NOTE — H&P
Hospital Medicine History & Physical      PCP: None None    Date of Admission: 3/26/2023    Date of Service: Pt seen/examined on 03/29/23        Chief Complaint:    Chief Complaint   Patient presents with    Altered Mental Status     Pt brought in by family for strange behavior 2-3weeks; lives with father; pt denies S/I or H/I          History Of Present Illness: The patient is a 34 y.o. female with Pmhx of anxiety, depression, asthma who presented to [facility] for AMS. Patient was seen and evaluated in the ED by the ED medical provider, patient was medically cleared for admission to [facility] at [facility]. This note serves as an admission medical H&P. Tobacco use: denies  ETOH use: denies  Illicit drug use: denies (UDS+ cannabis)    Patient is seen. She is disorganized and answers questions inappropriately. Denies any medical complaints at this time. Past Medical History:        Diagnosis Date    Anxiety     Asthma     Depression        Past Surgical History:        Procedure Laterality Date    BREAST SURGERY      EYE SURGERY         Medications Prior to Admission:    Prior to Admission medications    Medication Sig Start Date End Date Taking? Authorizing Provider   predniSONE (DELTASONE) 20 MG tablet Take 3 tabs orally daily for 3 days, then take 2 tabs orally for 3 days then take 1 tab orally for 3 days. 3/19/23   [place] Ganesh APRN - CNP   albuterol sulfate HFA (VENTOLIN HFA) 108 (90 Base) MCG/ACT inhaler Inhale 2 puffs into the lungs 4 times daily as needed for Wheezing 3/19/23   [place] Ganesh, APRN - CNP       Allergies:  Patient has no known allergies. Family History:   Positive as follows:    History reviewed. No pertinent family history.     REVIEW OF SYSTEMS:     Constitutional: Negative for fever   HENT: Negative for sore throat   Eyes: Negative for redness   Respiratory: Negative  for dyspnea, cough   Cardiovascular: Negative for chest pain   Gastrointestinal: Negative for vomiting, diarrhea
auditory,  [] visual,  [] olfactory, [] tactile  Delusions  [] none, [] grandiose,  [] jealous,  [] persecutory,  [] somatic,     [x] bizarre  Preoccupations  [x] none, [] violence, [] obsessions, [] other     Suicidal ideation  [x] denies, [] endorses  Homicidal ideation [x] denies, [] endorses  Thought process: [] logical, [] circumstantial, [] tangential, [] LESLIE,     [] simplistic, [x] disorganized  Associations:  [] logical and coherent, [] loosening, [x] disorganized  Insight:   [] good, [] fair, [x] poor  Judgment:  [] good, [] fair, [x] poor  Attention and concentration:     [] intact, [x] limited, [] able to focus on interview,     [] grossly impaired  Orientation:  [] person, place, time, situation     [x] disoriented to:   place, time, situation  Memory:  Remote memory [] intact, [x] impaired     Recent memory  [] intact, [x] impaired          IMPRESSION    Principal Problem:    Psychosis, [facility]  Resolved Problems:    * No resolved hospital problems. *       ______      Tx plan:  Prevent self injury, stabilize affect, restore sleep, treat depression, treat anxiety, establish/maintain aftercare, increase coping mechanisms, improve medication compliance. All conditions present on admission are being treated while pt is hospitalized. Discussed PHP after discharge as part of transition back to the community.      Medications  Current Facility-Administered Medications   Medication Dose Route Frequency Provider Last Rate Last Admin    paliperidone (INVEGA) extended release tablet 3 mg  3 mg Oral Daily [place] Krabbe, APRN - CNP        acetaminophen (TYLENOL) tablet 650 mg  650 mg Oral Q4H PRN [place] Krabbe, APRN - CNP        magnesium hydroxide (MILK OF MAGNESIA) 400 MG/5ML suspension 30 mL  30 mL Oral Daily PRN Chadd Krabbe, APRN - CNP        nicotine polacrilex (COMMIT) lozenge 2 mg  2 mg Oral Q1H PRN Chadd Krabbe, APRN - CNP        aluminum & magnesium hydroxide-simethicone (MAALOX)

## 2023-03-29 PROBLEM — J45.909 UNCOMPLICATED ASTHMA: Status: ACTIVE | Noted: 2023-03-29

## 2023-03-29 PROBLEM — E87.6 HYPOKALEMIA: Status: ACTIVE | Noted: 2023-03-29

## 2023-03-29 PROBLEM — Z00.00 ENCOUNTER FOR GENERAL MEDICAL EXAMINATION: Status: ACTIVE | Noted: 2023-03-29

## 2023-03-29 PROBLEM — F12.90 CANNABIS USE DISORDER: Status: ACTIVE | Noted: 2023-03-29

## 2023-03-29 PROCEDURE — 6360000002 HC RX W HCPCS: Performed by: PSYCHIATRY & NEUROLOGY

## 2023-03-29 PROCEDURE — 6370000000 HC RX 637 (ALT 250 FOR IP)

## 2023-03-29 PROCEDURE — 99222 1ST HOSP IP/OBS MODERATE 55: CPT

## 2023-03-29 PROCEDURE — 1240000000 HC EMOTIONAL WELLNESS R&B

## 2023-03-29 RX ORDER — PALIPERIDONE 3 MG/1
6 TABLET, EXTENDED RELEASE ORAL DAILY
Status: DISCONTINUED | OUTPATIENT
Start: 2023-03-30 | End: 2023-04-04 | Stop reason: HOSPADM

## 2023-03-29 RX ORDER — HALOPERIDOL 5 MG/ML
10 INJECTION INTRAMUSCULAR ONCE
Status: COMPLETED | OUTPATIENT
Start: 2023-03-29 | End: 2023-03-29

## 2023-03-29 RX ORDER — DIPHENHYDRAMINE HYDROCHLORIDE 50 MG/ML
50 INJECTION INTRAMUSCULAR; INTRAVENOUS EVERY 6 HOURS PRN
Status: DISCONTINUED | OUTPATIENT
Start: 2023-03-29 | End: 2023-03-29

## 2023-03-29 RX ORDER — LORAZEPAM 2 MG/ML
2 INJECTION INTRAMUSCULAR ONCE
Status: COMPLETED | OUTPATIENT
Start: 2023-03-29 | End: 2023-03-29

## 2023-03-29 RX ORDER — DIPHENHYDRAMINE HYDROCHLORIDE 50 MG/ML
50 INJECTION INTRAMUSCULAR; INTRAVENOUS ONCE
Status: COMPLETED | OUTPATIENT
Start: 2023-03-29 | End: 2023-03-29

## 2023-03-29 RX ADMIN — OLANZAPINE 5 MG: 10 TABLET, FILM COATED ORAL at 17:20

## 2023-03-29 RX ADMIN — OLANZAPINE 5 MG: 10 TABLET, FILM COATED ORAL at 01:48

## 2023-03-29 RX ADMIN — DIPHENHYDRAMINE HYDROCHLORIDE 50 MG: 50 INJECTION, SOLUTION INTRAMUSCULAR; INTRAVENOUS at 21:02

## 2023-03-29 RX ADMIN — HALOPERIDOL LACTATE 10 MG: 5 INJECTION, SOLUTION INTRAMUSCULAR at 21:02

## 2023-03-29 RX ADMIN — ACETAMINOPHEN 650 MG: 325 TABLET ORAL at 01:48

## 2023-03-29 RX ADMIN — LORAZEPAM 2 MG: 2 INJECTION INTRAMUSCULAR; INTRAVENOUS at 21:02

## 2023-03-29 RX ADMIN — PALIPERIDONE 3 MG: 3 TABLET, EXTENDED RELEASE ORAL at 10:00

## 2023-03-29 RX ADMIN — TRAZODONE HYDROCHLORIDE 50 MG: 50 TABLET ORAL at 01:48

## 2023-03-29 NOTE — FLOWSHEET NOTE
03/29/23 1035   Mental Status and Behavioral Exam   Normal No   Level of Assistance Independent/Self   Facial Expression Expressionless   Affect Unstable   Level of Consciousness Alert   Frequency of Checks 4 times per hour, close   Mood:Normal No   Mood Helpless   Motor Activity:Normal No   Motor Activity Increased   Eye Contact Fair   Observed Behavior Exit seeking; Impulsive   Sexual Misconduct History Current - no   Preception Sayre to person   Attention:Normal No   Attention Distractible; Unable to concentrate   Thought Processes Flight of ideas   Thought Content:Normal No   Thought Content Delusions;Paranoia   Depression Symptoms Impaired concentration   Anxiety Symptoms Generalized; Unexplained fears   Chantell Symptoms Poor judgment;Flight of ideas   Hallucinations Auditory (comment); Visual (comment)   Delusions Yes   Delusions Paranoid;Persecutory;Mandaen   Memory:Normal No   Memory Confabulation;Poor recent; Poor remote   Insight and Judgment No   Insight and Judgment Poor insight;Poor judgment

## 2023-03-30 PROCEDURE — 1240000000 HC EMOTIONAL WELLNESS R&B

## 2023-03-30 PROCEDURE — 6370000000 HC RX 637 (ALT 250 FOR IP)

## 2023-03-30 RX ADMIN — PALIPERIDONE 6 MG: 3 TABLET, EXTENDED RELEASE ORAL at 13:32

## 2023-03-30 ASSESSMENT — PAIN SCALES - GENERAL: PAINLEVEL_OUTOF10: 0

## 2023-03-31 PROCEDURE — 6370000000 HC RX 637 (ALT 250 FOR IP)

## 2023-03-31 PROCEDURE — 1240000000 HC EMOTIONAL WELLNESS R&B

## 2023-03-31 RX ORDER — ONDANSETRON 4 MG/1
4 TABLET, ORALLY DISINTEGRATING ORAL EVERY 8 HOURS PRN
Status: DISCONTINUED | OUTPATIENT
Start: 2023-03-31 | End: 2023-04-04 | Stop reason: HOSPADM

## 2023-03-31 RX ADMIN — ALUMINUM HYDROXIDE, MAGNESIUM HYDROXIDE, AND SIMETHICONE 30 ML: 200; 200; 20 SUSPENSION ORAL at 14:50

## 2023-03-31 RX ADMIN — PALIPERIDONE 6 MG: 3 TABLET, EXTENDED RELEASE ORAL at 08:04

## 2023-03-31 ASSESSMENT — PAIN SCALES - GENERAL
PAINLEVEL_OUTOF10: 0
PAINLEVEL_OUTOF10: 0

## 2023-03-31 ASSESSMENT — PAIN DESCRIPTION - LOCATION: LOCATION: ABDOMEN

## 2023-04-01 PROCEDURE — 1240000000 HC EMOTIONAL WELLNESS R&B

## 2023-04-01 PROCEDURE — 6370000000 HC RX 637 (ALT 250 FOR IP)

## 2023-04-01 RX ADMIN — ONDANSETRON 4 MG: 4 TABLET, ORALLY DISINTEGRATING ORAL at 20:34

## 2023-04-01 RX ADMIN — HYDROXYZINE HYDROCHLORIDE 50 MG: 50 TABLET, FILM COATED ORAL at 14:22

## 2023-04-01 RX ADMIN — OLANZAPINE 5 MG: 10 TABLET, FILM COATED ORAL at 14:22

## 2023-04-01 RX ADMIN — PALIPERIDONE 6 MG: 3 TABLET, EXTENDED RELEASE ORAL at 11:49

## 2023-04-01 RX ADMIN — ACETAMINOPHEN 650 MG: 325 TABLET ORAL at 20:34

## 2023-04-01 ASSESSMENT — PAIN SCALES - GENERAL: PAINLEVEL_OUTOF10: 10

## 2023-04-02 PROCEDURE — 6370000000 HC RX 637 (ALT 250 FOR IP)

## 2023-04-02 PROCEDURE — 1240000000 HC EMOTIONAL WELLNESS R&B

## 2023-04-02 RX ADMIN — OLANZAPINE 5 MG: 10 TABLET, FILM COATED ORAL at 09:06

## 2023-04-02 RX ADMIN — PALIPERIDONE 6 MG: 3 TABLET, EXTENDED RELEASE ORAL at 09:05

## 2023-04-02 RX ADMIN — TRAZODONE HYDROCHLORIDE 50 MG: 50 TABLET ORAL at 00:14
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
## 2023-04-02 NOTE — BH NOTE
Maddison Alston has a son who is 5years old. His name is Sharyn Horse and he is safe with his biological father per [facility] father Marty Myers.
Medication effective. Patient sleeping at this time.
Patient appears slightly more oriented this evening. Able to tell this writer the name of her son. Remains isolative to self. Still remains psychotic. Sitting in hallway with shirt over head. Declines needs.
Patient awake and approached nurses station. She states she is \"feeling really good today. \" She is able to tell this writer her full name and date of birth. She is unable to say why she is here or events leading up to admission. She does not know the year. She states she is sleeping well. When asked her sons name she was able to verbalize his name is Kathy Prasad and that he is with his biological father. She does verbalize she feels confused. When asked about any drug use she states she use to take drugs but no longer takes any. She does state that she bought Delta 8 from bttn in [facility]. It is confirmed this is a real store/gas station. She does state she is \"delusional\" and states she has a hard time distinguishing between real events and events that did not occur. She is compliant with medication.
Patient currently sitting in dayroom watching TV. Declines needs.
Patient given Invega per order without incident.
Patient in her room. Awake, alert and oriented. Able to state her name, , where she is, month and year and also reported that she had been \"doing adderall and delta 8\" prior to admission. Discussed side effects of both with patient who verbalized, \"I'm not doing that anymore\". Appetite and fluid intake have been good today. Large cup of ice water provided at this time. Patient declined to come out of her room, stated, \"no thanks, I'm good\".
Patient increasingly agitated. Escalating behaviors. States she is god and staffs fate is in her hands. States \"I am the whole universe. \"
Patient is increasingly anxious. Appears scared, lying in her bed. She states she feels anxious regarding how confused she feels. Continues to endorse she does not know what is real and what isnt. Given Atarax and Zyprexa per order.
Patient remains in bed sleeping at this time.
Patient remains sleeping in room in bed at this time.
Patient resting quietly in bed this writer asked, patient if she would like a sleeping pill. Patient refused.
Patient resting quietly in bed. This writer asked, patient if she would like a sleeping pill.  Patient states, \"No.\"
Patient took a shower and came out to the milieu. Patient given a pair of yellow socks. Patient agreed to answer the admission questions but when in the sensory room patient sat in a chair with her head back and eyes closed hand gesturing. Patient refused to answer any of the admission questions. This writer tried x 2 to complete admission but patient refused. Patient allowed this writer to do her physical assessment. Patient resting quietly in bed @ present. No c/o's voiced @ present.
Patient was found lying in female peers bed. She is A&O to self only when called by first name, but is unable to tell this writer what her name is. She is confused and disorganized but pleasant in conversation. She gives illogical answers to all assessment questions. She appears to be meditating on her bed. Begins praying when given PO Invega. When asked who she is praying to she states \"brian mouse. \" She tells this writer she is not suicidal, but tried to swim to the bottom of the pool at [place] when she was [age] years old to kill herself but the pool wasn't deep enough. Noted to clog her toilet with toilet paper. When asked about this she states she clogged the toilet to \"let her Aunt out. \" She can give no meaningful answers to assessment questions. Making odd hand gestures during conversation and is animated in conversation.
Patients supposed boyfriend, Nicolasa, came to visit patient. When patient saw Nicolasa, she immediately turned around and went back to her room stating \"that man thinks he is Justin and I don't want to meet with him. \" She was that she wanted Nicolasa to leave. Nicolasa initially stated he wanted to wait until the patient left and was told by staff that he needed to leave the unit. Security was called as patients visitor sat in outpatient lobby requesting to return to unit to see patient. He was agreeable to leaving. Spoke to STONE Ortiz and until the patient is more organized, advised no visitors.
Pt disorganized and intrusive, going into other patient's rooms and getting into their beds. Moved to small side of unit. Pt agreeable to move, states she would like to be away from [place] the people. \"
Spoke to Seeqpod briefly, patients father. He states symptoms started 4-5 days ago. He states Shameka Crockett began throwing things in his home away stating they were possessed. He states her mother had a \"mental breakdown\" at age 28. He states patient went to [facility] as an adolescent and was on medication, but unsure what the medication was. States he will have his sister call to provide further collateral.     Spoke to Elissa Austin, patients aunt. She states there is a long hx of mental illness in paternal family. Carlos Pritchard states that Fabricio father has hx of paranoia symptoms when stressed. States last episode was few years ago. States Fabricio brother also has a hx of psychotic symptoms. Fathers sister Tonia Hunger aunt) is diagnosed with schizophrenia. Carlos Pritchard believes there is a possibility of drug abuse, but she is unsure because she is not around often enough to speak to that.
Updated patients boyfriend with her approval. He states he will provide collateral information when he comes to visit Maddison Gamaliel today, and that he is uncomfortable discussing her business over the phone.
resources  ( ) Referral for counseling faxed to [facility]                                                                                                                   ( ) Patient refused counseling  ( ) Patient has not smoked in the last 30 days    Metabolic Screening:    Lab Results   Component Value Date    LABA1C 5.2 03/26/2023       Lab Results   Component Value Date    CHOL 164 03/26/2023     Lab Results   Component Value Date    TRIG 64 03/26/2023     Lab Results   Component Value Date    HDL 58 03/26/2023     No components found for: [facility] [place]  Lab Results   Component Value Date    LABVLDL 13 03/26/2023         Body mass index is 25.92 kg/m². BP Readings from Last 2 Encounters:   03/28/23 109/71   03/19/23 128/82         Pt admitted with followings belongings:  Dental Appliances: None  Vision - Corrective Lenses: None  Hearing Aid: None  Jewelry: Bracelet  Body Piercings Removed: N/A  Clothing: Shirt, Pants  Other Valuables:  Other (Comment) (NONE)    Arlene Rivera RN

## 2023-04-03 PROCEDURE — 1240000000 HC EMOTIONAL WELLNESS R&B

## 2023-04-03 PROCEDURE — 6370000000 HC RX 637 (ALT 250 FOR IP)

## 2023-04-03 RX ADMIN — TRAZODONE HYDROCHLORIDE 50 MG: 50 TABLET ORAL at 21:53

## 2023-04-03 RX ADMIN — PALIPERIDONE 6 MG: 3 TABLET, EXTENDED RELEASE ORAL at 08:35

## 2023-04-03 ASSESSMENT — PAIN SCALES - GENERAL: PAINLEVEL_OUTOF10: 0

## 2023-04-04 VITALS
HEART RATE: 84 BPM | HEIGHT: 58 IN | OXYGEN SATURATION: 97 % | TEMPERATURE: 97.8 F | RESPIRATION RATE: 16 BRPM | BODY MASS INDEX: 26.03 KG/M2 | SYSTOLIC BLOOD PRESSURE: 119 MMHG | WEIGHT: 124 LBS | DIASTOLIC BLOOD PRESSURE: 70 MMHG

## 2023-04-04 PROCEDURE — 6370000000 HC RX 637 (ALT 250 FOR IP)

## 2023-04-04 PROCEDURE — 5130000000 HC BRIDGE APPOINTMENT

## 2023-04-04 RX ORDER — PALIPERIDONE 6 MG/1
6 TABLET, EXTENDED RELEASE ORAL DAILY
Qty: 30 TABLET | Refills: 0 | Status: SHIPPED | OUTPATIENT
Start: 2023-04-05 | End: 2023-04-04 | Stop reason: SDUPTHER

## 2023-04-04 RX ORDER — PALIPERIDONE 6 MG/1
6 TABLET, EXTENDED RELEASE ORAL DAILY
Qty: 30 TABLET | Refills: 0 | Status: SHIPPED | OUTPATIENT
Start: 2023-04-05

## 2023-04-04 RX ADMIN — PALIPERIDONE 6 MG: 3 TABLET, EXTENDED RELEASE ORAL at 08:59

## 2023-04-04 NOTE — PROGRESS NOTES
1:1 Assessment 15 minutes. Patient is alert and oriented x 2, not to time or situation. Uses direct eye contact and is dressed appropriately in street clothing. Patient denied SI/HI,A/V hallucinations. The patient stated her mood was:\"OK\". Patient did attend groups this shift and participated appropriately. Patient is hoping to be discharged from the hospital in the next couple days. Patient is medication compliant. Judgement,insight and impulse control are limited. Patient denied any physical complaints this evening. Vital signs are noted to be within normal limits. Safety checks continue Q 15 minutes throughout the shift. PRNS this shift? trazodone. Patient obtained 6 hours of sleep this shift.
Alena Gordon is resting quietly in bed. Respirations are even and easy. Medication is noted to be effective.
Ativan 2mg, Haldol 10mg and Benadryl 50mg given to Right Deltoid Per MD order. Patient requested this RN to administer injections, refused to allow her nurse to administer.
Behavioral Services                                              Medicare Re-Certification    I certify that the inpatient psychiatric hospital services furnished since the previous certification/re-certification were, and continue to be, medically necessary for;    [x] (1) Treatment which could reasonably be expected to improve the patient's condition,    [x] (2) Or for diagnostic study. Estimated length of stay/service 2-5 days    Plan for post-hospital care outpatient services    This patient continues to need, on a daily basis, active treatment furnished directly by or requiring the supervision of inpatient psychiatric personnel.     Electronically signed by INDIO Jane CNP on 4/3/2023 at 10:18 AM
Behavioral Services  Medicare Certification Upon Admission    I certify that this patient's inpatient psychiatric hospital admission is medically necessary for:    [x] (1) Treatment which could reasonably be expected to improve this patient's condition,       [x] (2) Or for diagnostic study;     AND     [x](2) The inpatient psychiatric services are provided while the individual is under the care of a physician and are included in the individualized plan of care.     Estimated length of stay/service 2-5 days    Plan for post-hospital care outpatient services    Electronically signed by INDIO Steven CNP on 3/28/2023 at 11:51 AM
Bridge Appointment completed: Reviewed Discharge Instructions with patient. Patient verbalizes understanding and agreement with the discharge plan using the teachback method.      Referral for Outpatient Tobacco Cessation Counseling, upon discharge (kareem X if applicable and completed):    ( )  Hospital staff assisted patient to call Quit Line or faxed referral                                   during hospitalization                  ( )  Recognizing danger situations (included triggers and roadblocks), if not completed on admission                    ( )  Coping skills (new ways to manage stress, exercise, relaxation techniques, changing routine, distraction), if not completed on admission                                                           ( X)  Basic information about quitting (benefits of quitting, techniques in how to quit, available resources, if not completed on admission  ( ) Referral for counseling faxed to [facility]   ( ) Patient refused referral  ( ) Patient refused counseling  ( ) Patient refused smoking cessation medication upon discharge    Vaccinations (kareem X if applicable and completed):  ( ) Patient states already received influenza vaccine elsewhere  ( ) Patient received influenza vaccine during this hospitalization  ( X) Patient refused influenza vaccine at this time
Department of Psychiatry  AttendingProgress Note  Chief Complaint: Psychosis    Patient's chart was reviewed and collaborated with  about the treatment plan. SUBJECTIVE:    Pt up in her room today, brushing hair and straightening her room. Pt afraid to leave her room d/t another patient that makes her uncomfortable. Pt to be moved to large side today. Will be better able to socialize and attend groups. I would also like to see how she manages in a more social environment. Pt tells me she wants to start singing more when she leaves. Planning to call her sister and go back to [employer] and singing. Pts thoughts more organized, less paranoid. Plan:  Continue Invega 6mg daily    Patient is feeling better. Suicidal ideation:  denies suicidal ideation. Patient does not have medication side effects. ROS: Patient has new complaints: no  Sleeping adequately:  Yes   Appetite adequate: Yes  Attending groups: Yes  Visitors:No    OBJECTIVE    Physical  VITALS:  /73   Pulse 89   Temp 98.4 °F (36.9 °C) (Oral)   Resp 18   Ht 4' 10\" (1.473 m)   Wt 124 lb (56.2 kg)   LMP  (LMP Unknown) Comment: Patient states, \"I don't know. \"  SpO2 98%   BMI 25.92 kg/m²     Mental Status Examination:  Patients appearance was well-appearing, street clothes, and in chair. Thoughts are Logical. Homicidal ideations none. No abnormal movements, tics or mannerisms. Memory intact Aims 0. Concentration Fair. Alert and oriented X 4. Insight and Judgement normal insight and judgment. Patient was cooperative.  Patient gait normal. Mood dysthymic, affect normal affect Hallucinations Absent, suicidal ideations no specific plan to harm self Speech normal pitch and normal volume    Data  Labs:   Admission on 03/26/2023   Component Date Value Ref Range Status    WBC 03/26/2023 9.0  4.0 - 11.0 K/uL Final    RBC 03/26/2023 4.13  4.00 - 5.20 M/uL Final    Hemoglobin 03/26/2023 12.8  12.0 - 16.0 g/dL Final
Encounter for medical H&P deferred to tomorrow per nursing request.    Nikita Payton PA-C  3/28/2023   3:44 PM
Group Therapy Note    Date: 4/3/2023  Start Time: 20:00  End Time:  21:00  Number of Participants: 7    Type of Group: Recreational  wrap up    Wellness Binder Information  Module Name:  /  Session Number:  /    Patient's Goal:  coping skills    Notes:  continuing to work on goal    Status After Intervention:  Unchanged    Participation Level:  Active Listener and Interactive    Participation Quality: Appropriate, Attentive, and Sharing      Speech:  pressured      Thought Process/Content: Logical      Affective Functioning: Blunted      Mood: anxious      Level of consciousness:  Alert and Attentive      Response to Learning: Able to change behavior      Endings: None Reported    Modes of Intervention: Socialization and Problem-solving      Discipline Responsible: [address] [employer]      Signature:  Javier August
Patient continues awake & sitting in the dayroom, at 2130. Patient is now quiet & no longer talking to her self. Patient declined to have another staff check her vitals. Patient just now got up walked to her room & shut her door.  Nick Yusuf R.N.
Patient requested and received trazodone 50 mg po for sleep.
Patient requested and received trazodone 50 mg po for sleep.
Patient was taking a shower, when checked on by writer at 19:45. Patient had no towels & writer brought her towels & hygiene items. Patient was pleasant at that time. Patient went back to patient's room at 20:00 to take her vitals & patient was lying in bed & refused to allow writer to take her vitals. \"That machine isn't sanitize. You don't have gloves on. I'm slowly dying & you know it. \" Patient was becoming loud. Patient walked out of her room & closed the door on writer. Once out on the unit, patient stated,\"You have fingernail polish like my mother. You are putting a dirty curse on me. \" Patient reported that she will sit in the dayroom, until her family picks her up. Patient has been talking to herself at intervals. Patient stated she will take nothing from writer. \"You are not clean. You touched your hair. \" Patient declined off of atarax & trazodone from [facility]. Patient reported that she would take a shot, but not from [address] [place] [address]. Patient kept going to the locked door between the units & was pushing on it, trying to open it. Dr. Rosie Mackenzie was notified at 20:40, of her behavior & what medication, that she had on the previous shift, with orders received. Patient tried to block writer coming into the team station, after writer checked on another patient.  Malka Yusuf R.N.
Pt given PRN trazodone for sleep.
Pt jumped desk, stopped once then re-jumped successfully sliding over counter, onto desk, sliding onto floor. No injury to patient. Staff assisted pt out of nurses station to room.
RN administered Zyprexa 5 mg per orders for increased agitation, pt was pacing, wringing hands difficult to redirect.   Pt took medication then went to room to rest.
Writer made an attempt to call pt's sister to see if pt can come to her home per pt request. Writer MARLENA for [facility] [phone]
Writer was able to get a hold of patient's sister Jluis Lin and she will start heading this way to pick her up.
( X) Basic information about quitting (benefits of quitting, techniques in how to quit, available resources  ( ) Referral for counseling faxed to [facility]                                                                                                                   ( ) Patient refused counseling  ( ) Patient refused referral  ( ) Patient refused prescription upon discharge  ( ) Patient has not smoked in the last 30 days    Metabolic Screening:    Lab Results   Component Value Date    LABA1C 5.2 03/26/2023       Lab Results   Component Value Date    CHOL 164 03/26/2023     Lab Results   Component Value Date    TRIG 64 03/26/2023     Lab Results   Component Value Date    HDL 58 03/26/2023     No components found for: [facility]  Lab Results   Component Value Date    LABVLDL 13 03/26/2023       Hans Antoine RN
(A)  10 - 30 ug/mL Final    Comment: Therapeutic Range: 10.0-30.0 ug/mL  Toxic: >=380 ug/mL      Salicylate, Serum 32/29/5865 <0.3 (A)  15.0 - 30.0 mg/dL Final    Comment: Therapeutic Range: 15.0-30.0 mg/dL  Toxic: >30.0 mg/dL      hCG Qual 03/26/2023 Negative  Detects HCG level >10 MIU/mL Final    Magnesium 03/26/2023 2.00  1.80 - 2.40 mg/dL Final    TSH 03/26/2023 0.56  0.27 - 4.20 uIU/mL Final    SARS-CoV-2 RNA, RT PCR 03/27/2023 NOT DETECTED  NOT DETECTED Final    Comment: Not Detected results do not preclude SARS-CoV-2 infection and  should not be used as the sole basis for patient management  decisions. Results must be combined with clinical observations,  patient history, and epidemiological information. Testing was performed using [employer] SARS-CoV-2 and Influenza A/B  nucleic acid assay. This test is a multiplex Real-Time Reverse  Transcriptase Polymerase Chain Reaction (RT-PCR)-based in vitro  diagnostic test intended for the qualitative detection of nucleic  acids from SARS-CoV-2, influenza A, and influenza B in nasopharyngeal  and nasal swab specimens for use under the FDAs Emergency Use  Authorization (EUA) only. Patient Fact Sheet:  FindDrives.pl  Provider Fact Sheet: FindDrives.pl  EUA: FindDrives.pl  IFU: FindDrives.pl    Methodology:  RT-PCR      INFLUENZA A 03/27/2023 NOT DETECTED  NOT DETECTED Final    INFLUENZA B 03/27/2023 NOT DETECTED  NOT DETECTED Final    Cholesterol, Total 03/26/2023 164  0 - 199 mg/dL Final    Triglycerides 03/26/2023 64  0 - 150 mg/dL Final    HDL 03/26/2023 58  40 - 60 mg/dL Final    Comment: An HDL cholesterol less than 40 mg/dL is low and  constitutes a coronary heart disease risk factor. An HDL cholesterol greater than 60 mg/dL is a  negative risk factor for coronary heart disease.       LDL Calculated 03/26/2023 93  <100 mg/dL Final    VLDL
drug  classes as part of a medical workup. Confirmatory testing  by another method should be ordered if clinically indicated. Acetaminophen Level 03/26/2023 <5 (A)  10 - 30 ug/mL Final    Comment: Therapeutic Range: 10.0-30.0 ug/mL  Toxic: >=188 ug/mL      Salicylate, Serum 19/11/2084 <0.3 (A)  15.0 - 30.0 mg/dL Final    Comment: Therapeutic Range: 15.0-30.0 mg/dL  Toxic: >30.0 mg/dL      hCG Qual 03/26/2023 Negative  Detects HCG level >10 MIU/mL Final    Magnesium 03/26/2023 2.00  1.80 - 2.40 mg/dL Final    TSH 03/26/2023 0.56  0.27 - 4.20 uIU/mL Final    SARS-CoV-2 RNA, RT PCR 03/27/2023 NOT DETECTED  NOT DETECTED Final    Comment: Not Detected results do not preclude SARS-CoV-2 infection and  should not be used as the sole basis for patient management  decisions. Results must be combined with clinical observations,  patient history, and epidemiological information. Testing was performed using [employer] SARS-CoV-2 and Influenza A/B  nucleic acid assay. This test is a multiplex Real-Time Reverse  Transcriptase Polymerase Chain Reaction (RT-PCR)-based in vitro  diagnostic test intended for the qualitative detection of nucleic  acids from SARS-CoV-2, influenza A, and influenza B in nasopharyngeal  and nasal swab specimens for use under the FDAs Emergency Use  Authorization (EUA) only.     Patient Fact Sheet:  FindDrives.pl  Provider Fact Sheet: FindDrives.pl  EUA: FindDrives.pl  IFU: FindDrives.pl    Methodology:  RT-PCR      INFLUENZA A 03/27/2023 NOT DETECTED  NOT DETECTED Final    INFLUENZA B 03/27/2023 NOT DETECTED  NOT DETECTED Final    Cholesterol, Total 03/26/2023 164  0 - 199 mg/dL Final    Triglycerides 03/26/2023 64  0 - 150 mg/dL Final    HDL 03/26/2023 58  40 - 60 mg/dL Final    Comment: An HDL cholesterol less than 40 mg/dL is low and  constitutes a coronary heart disease risk
6.5%  Increased risk of diabetes (Prediabetes): 5.7-6.4%  Glycemic Control: Nonpregnant Adults: <7.0%                    Pregnant: <6.0%        eAG 03/26/2023 102.5  mg/dL Final    Potassium 03/28/2023 3.7  3.5 - 5.1 mmol/L Final            Medications  Current Facility-Administered Medications: ondansetron (ZOFRAN-ODT) disintegrating tablet 4 mg, 4 mg, Oral, Q8H PRN  paliperidone (INVEGA) extended release tablet 6 mg, 6 mg, Oral, Daily  acetaminophen (TYLENOL) tablet 650 mg, 650 mg, Oral, Q4H PRN  magnesium hydroxide (MILK OF MAGNESIA) 400 MG/5ML suspension 30 mL, 30 mL, Oral, Daily PRN  nicotine polacrilex (COMMIT) lozenge 2 mg, 2 mg, Oral, Q1H PRN  aluminum & magnesium hydroxide-simethicone (MAALOX) 200-200-20 MG/5ML suspension 30 mL, 30 mL, Oral, Q6H PRN  hydrOXYzine HCl (ATARAX) tablet 50 mg, 50 mg, Oral, TID PRN  OLANZapine (ZYPREXA) tablet 5 mg, 5 mg, Oral, Q4H PRN **OR** OLANZapine (ZYPREXA) 10 mg in sterile water 2 mL injection, 10 mg, IntraMUSCular, Q4H PRN  diphenhydrAMINE (BENADRYL) injection 50 mg, 50 mg, IntraMUSCular, Q4H PRN  traZODone (DESYREL) tablet 50 mg, 50 mg, Oral, Nightly PRN  albuterol sulfate HFA (PROVENTIL;VENTOLIN;PROAIR) 108 (90 Base) MCG/ACT inhaler 2 puff, 2 puff, Inhalation, 4x Daily PRN    ASSESSMENT AND PLAN    Principal Problem:    Psychosis, unspecified psychosis type (HCC)  Active Problems:    Encounter for general medical examination    Uncomplicated asthma    Cannabis use disorder    Hypokalemia  Resolved Problems:    * No resolved hospital problems. *         1. Patient's symptoms   are improving  2. Probable discharge is next week, voluntary, verbal from pt  3. Discharge planning is incomplete  4. Suicidal ideation is better  5. Total time with patient was 40 minutes and more than 50 % of that time was spent counseling the patient on their symptoms, treatment and expected goals.      Jamila Schilling, PMHNP-BC
week/possible probate  3. Discharge planning is incomplete  4. Suicidal ideation is better  5. Total time with patient was 40 minutes and more than 50 % of that time was spent counseling the patient on their symptoms, treatment and expected goals.      Jamila Schilling, GUILLERMOP-BC
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
## 2023-04-04 NOTE — PRE-CERTIFICATION NOTE
Received notice from unit that [facility] needs PA per [facility]. [facility] [phone], transferred multiple times and finally given phone number [phone] where could not speak to anyone, only leave a vm requesting call back. Awaiting call back. If this is not resolved prior to DC would request [facility] to fill as dori. If that is not possible could send script with patient. If she is going to [facility] they provide behavioral health and medication services as noted from their website:    [address] and [address] - provides state of the art medical care services at the [facility] and the [facility] [employer]  [facility]. The clinics are operated by [facility] for the [facility], which focuses on treating the whole person including medical care, behavioral health care, medications, laboratory testing, health education, referrals to specialists and help getting you access to other services you might need. Our experienced family medicine doctors are familiar with issues people face when confronted with homelessness and it is our goal to be every patients medical home. Medical and behavioral health services are provided regardless of the patients ability to pay.     Other health partners and services include:    - [id number] ([address] of [employer]    - Various podiatrists - typically utilized during the winter months

## 2023-04-04 NOTE — DISCHARGE SUMMARY
link  [email or web address]_[email or web address]  Effective Oct 3, 2022  These results are not intended for use in patients  <25years of age. eGFR results are calculated without  a race factor using the 2021 CKD-EPI equation. Careful  clinical correlation is recommended, particularly when  comparing to results calculated using previous equations. The CKD-EPI equation is less accurate in patients with  extremes of muscle mass, extra-renal metabolism of  creatinine, excessive creatinine ingestion, or following  therapy that affects renal tubular secretion. Calcium 03/26/2023 9.1  8.3 - 10.6 mg/dL Final    Total Protein 03/26/2023 6.2 (L)  6.4 - 8.2 g/dL Final    Albumin 03/26/2023 4.3  3.4 - 5.0 g/dL Final    Albumin/Globulin Ratio 03/26/2023 2.3 (H)  1.1 - 2.2 Final    Total Bilirubin 03/26/2023 0.3  0.0 - 1.0 mg/dL Final    Alkaline Phosphatase 03/26/2023 53  40 - 129 U/L Final    ALT 03/26/2023 15  10 - 40 U/L Final    AST 03/26/2023 15  15 - 37 U/L Final    Ethanol Lvl 03/26/2023 None Detected  mg/dL Final    Comment:    None Detected  Conversion factor:  100 mg/dl = .100 g/dl  For Medical Purposes Only      Amphetamine Screen, Urine 03/26/2023 Neg  Negative <1000ng/mL Final    Barbiturate Screen, Ur 03/26/2023 Neg  Negative <200 ng/mL Final    Benzodiazepine Screen, Urine 03/26/2023 Neg  Negative <200 ng/mL Final    Cannabinoid Scrn, Ur 03/26/2023 POSITIVE (A)  Negative <50 ng/mL Final    Cocaine Metabolite Screen, Urine 03/26/2023 Neg  Negative <300 ng/mL Final    Opiate Scrn, Ur 03/26/2023 Neg  Negative <300 ng/mL Final    Comment: \"Therapeutic levels of pain medication, especially oxycontin and synthetic  opioids, may not be detected by this Methodology. Pain management screen  panel  Drug panel-PM-Hi Res Ur, Interp (PAIN) should be considered for drug  monitoring \".       PCP Screen, Urine 03/26/2023 Neg  Negative <25 ng/mL Final    Methadone Screen, Urine 03/26/2023 Neg  Negative

## 2023-04-05 ENCOUNTER — FOLLOWUP TELEPHONE ENCOUNTER (OUTPATIENT)
Dept: PSYCHIATRY | Age: 33
End: 2023-04-05

## 2023-04-06 ENCOUNTER — FOLLOWUP TELEPHONE ENCOUNTER (OUTPATIENT)
Dept: PSYCHIATRY | Age: 33
End: 2023-04-06

## 2023-04-07 ENCOUNTER — FOLLOWUP TELEPHONE ENCOUNTER (OUTPATIENT)
Dept: PSYCHIATRY | Age: 33
End: 2023-04-07

## 2023-10-07 ENCOUNTER — HOSPITAL ENCOUNTER (INPATIENT)
Age: 33
LOS: 9 days | Discharge: HOME | DRG: 750 | End: 2023-10-17
Attending: EMERGENCY MEDICINE | Admitting: PSYCHIATRY & NEUROLOGY
Payer: MEDICAID

## 2023-10-07 DIAGNOSIS — Z86.59 HISTORY OF SCHIZOPHRENIA: ICD-10-CM

## 2023-10-07 DIAGNOSIS — F22 PARANOID (HCC): ICD-10-CM

## 2023-10-07 DIAGNOSIS — R44.3 HALLUCINATIONS: Primary | ICD-10-CM

## 2023-10-07 LAB
ALBUMIN SERPL-MCNC: 5 G/DL (ref 3.4–5)
ALBUMIN/GLOB SERPL: 1.7 {RATIO} (ref 1.1–2.2)
ALP SERPL-CCNC: 65 U/L (ref 40–129)
ALT SERPL-CCNC: <5 U/L (ref 10–40)
AMPHETAMINES UR QL SCN>1000 NG/ML: ABNORMAL
ANION GAP SERPL CALCULATED.3IONS-SCNC: 15 MMOL/L (ref 3–16)
APAP SERPL-MCNC: <5 UG/ML (ref 10–30)
AST SERPL-CCNC: 12 U/L (ref 15–37)
BARBITURATES UR QL SCN>200 NG/ML: ABNORMAL
BASOPHILS # BLD: 0 K/UL (ref 0–0.2)
BASOPHILS NFR BLD: 0.3 %
BENZODIAZ UR QL SCN>200 NG/ML: POSITIVE
BILIRUB SERPL-MCNC: 0.4 MG/DL (ref 0–1)
BUN SERPL-MCNC: 5 MG/DL (ref 7–20)
CALCIUM SERPL-MCNC: 9.4 MG/DL (ref 8.3–10.6)
CANNABINOIDS UR QL SCN>50 NG/ML: POSITIVE
CHLORIDE SERPL-SCNC: 95 MMOL/L (ref 99–110)
CO2 SERPL-SCNC: 21 MMOL/L (ref 21–32)
COCAINE UR QL SCN: ABNORMAL
CREAT SERPL-MCNC: <0.5 MG/DL (ref 0.6–1.1)
DEPRECATED RDW RBC AUTO: 13 % (ref 12.4–15.4)
DRUG SCREEN COMMENT UR-IMP: ABNORMAL
EOSINOPHIL # BLD: 0 K/UL (ref 0–0.6)
EOSINOPHIL NFR BLD: 0.2 %
ETHANOLAMINE SERPL-MCNC: 26 MG/DL (ref 0–0.08)
FENTANYL SCREEN, URINE: ABNORMAL
GFR SERPLBLD CREATININE-BSD FMLA CKD-EPI: >60 ML/MIN/{1.73_M2}
GLUCOSE SERPL-MCNC: 119 MG/DL (ref 70–99)
HCG SERPL QL: NEGATIVE
HCT VFR BLD AUTO: 41.7 % (ref 36–48)
HGB BLD-MCNC: 14.6 G/DL (ref 12–16)
LYMPHOCYTES # BLD: 1.3 K/UL (ref 1–5.1)
LYMPHOCYTES NFR BLD: 15.2 %
MCH RBC QN AUTO: 31.2 PG (ref 26–34)
MCHC RBC AUTO-ENTMCNC: 35.1 G/DL (ref 31–36)
MCV RBC AUTO: 89 FL (ref 80–100)
METHADONE UR QL SCN>300 NG/ML: ABNORMAL
MONOCYTES # BLD: 0.5 K/UL (ref 0–1.3)
MONOCYTES NFR BLD: 5.3 %
NEUTROPHILS # BLD: 6.7 K/UL (ref 1.7–7.7)
NEUTROPHILS NFR BLD: 79 %
OPIATES UR QL SCN>300 NG/ML: ABNORMAL
OXYCODONE UR QL SCN: ABNORMAL
PCP UR QL SCN>25 NG/ML: ABNORMAL
PH UR STRIP: 6 [PH]
PLATELET # BLD AUTO: 361 K/UL (ref 135–450)
PMV BLD AUTO: 8.2 FL (ref 5–10.5)
POTASSIUM SERPL-SCNC: 3.8 MMOL/L (ref 3.5–5.1)
PROT SERPL-MCNC: 7.9 G/DL (ref 6.4–8.2)
RBC # BLD AUTO: 4.69 M/UL (ref 4–5.2)
SALICYLATES SERPL-MCNC: <0.3 MG/DL (ref 15–30)
SODIUM SERPL-SCNC: 131 MMOL/L (ref 136–145)
WBC # BLD AUTO: 8.5 K/UL (ref 4–11)

## 2023-10-07 PROCEDURE — 84703 CHORIONIC GONADOTROPIN ASSAY: CPT

## 2023-10-07 PROCEDURE — 80179 DRUG ASSAY SALICYLATE: CPT

## 2023-10-07 PROCEDURE — 36415 COLL VENOUS BLD VENIPUNCTURE: CPT

## 2023-10-07 PROCEDURE — 80053 COMPREHEN METABOLIC PANEL: CPT

## 2023-10-07 PROCEDURE — 85025 COMPLETE CBC W/AUTO DIFF WBC: CPT

## 2023-10-07 PROCEDURE — 80143 DRUG ASSAY ACETAMINOPHEN: CPT

## 2023-10-07 PROCEDURE — 82077 ASSAY SPEC XCP UR&BREATH IA: CPT

## 2023-10-07 PROCEDURE — 99285 EMERGENCY DEPT VISIT HI MDM: CPT

## 2023-10-07 PROCEDURE — 80307 DRUG TEST PRSMV CHEM ANLYZR: CPT

## 2023-10-07 PROCEDURE — 90792 PSYCH DIAG EVAL W/MED SRVCS: CPT | Performed by: NURSE PRACTITIONER

## 2023-10-07 ASSESSMENT — ENCOUNTER SYMPTOMS
VOMITING: 0
BLOOD IN STOOL: 0
COUGH: 0
NAUSEA: 0
BACK PAIN: 0
SORE THROAT: 0
SHORTNESS OF BREATH: 0
RHINORRHEA: 0
ABDOMINAL PAIN: 0
EYE PAIN: 0
DIARRHEA: 0

## 2023-10-07 ASSESSMENT — PAIN - FUNCTIONAL ASSESSMENT: PAIN_FUNCTIONAL_ASSESSMENT: NONE - DENIES PAIN

## 2023-10-08 PROBLEM — F20.1 DISORGANIZED SCHIZOPHRENIA (HCC): Status: ACTIVE | Noted: 2023-03-27

## 2023-10-08 PROBLEM — E87.1 HYPONATREMIA: Status: ACTIVE | Noted: 2023-10-08

## 2023-10-08 PROBLEM — Z86.59 HISTORY OF SCHIZOPHRENIA: Status: ACTIVE | Noted: 2023-10-08

## 2023-10-08 PROBLEM — F15.20 METHAMPHETAMINE USE DISORDER, MODERATE (HCC): Status: ACTIVE | Noted: 2023-10-08

## 2023-10-08 PROBLEM — R44.3 HALLUCINATIONS: Status: ACTIVE | Noted: 2023-10-08

## 2023-10-08 LAB — POTASSIUM SERPL-SCNC: 3.5 MMOL/L (ref 3.5–5.1)

## 2023-10-08 PROCEDURE — 99223 1ST HOSP IP/OBS HIGH 75: CPT | Performed by: PSYCHIATRY & NEUROLOGY

## 2023-10-08 PROCEDURE — 6370000000 HC RX 637 (ALT 250 FOR IP): Performed by: NURSE PRACTITIONER

## 2023-10-08 PROCEDURE — 1240000000 HC EMOTIONAL WELLNESS R&B

## 2023-10-08 PROCEDURE — 6370000000 HC RX 637 (ALT 250 FOR IP): Performed by: PSYCHIATRY & NEUROLOGY

## 2023-10-08 PROCEDURE — 99221 1ST HOSP IP/OBS SF/LOW 40: CPT | Performed by: NURSE PRACTITIONER

## 2023-10-08 PROCEDURE — 36415 COLL VENOUS BLD VENIPUNCTURE: CPT

## 2023-10-08 PROCEDURE — 84132 ASSAY OF SERUM POTASSIUM: CPT

## 2023-10-08 RX ORDER — PALIPERIDONE 3 MG/1
3 TABLET, EXTENDED RELEASE ORAL DAILY
Status: COMPLETED | OUTPATIENT
Start: 2023-10-08 | End: 2023-10-08

## 2023-10-08 RX ORDER — LORAZEPAM 1 MG/1
1 TABLET ORAL ONCE
Status: COMPLETED | OUTPATIENT
Start: 2023-10-08 | End: 2023-10-08

## 2023-10-08 RX ORDER — OLANZAPINE 5 MG/1
5 TABLET ORAL EVERY 4 HOURS PRN
Status: DISCONTINUED | OUTPATIENT
Start: 2023-10-08 | End: 2023-10-12

## 2023-10-08 RX ORDER — ACETAMINOPHEN 325 MG/1
650 TABLET ORAL EVERY 8 HOURS PRN
Status: DISCONTINUED | OUTPATIENT
Start: 2023-10-08 | End: 2023-10-17 | Stop reason: HOSPADM

## 2023-10-08 RX ORDER — HYDROXYZINE 50 MG/1
50 TABLET, FILM COATED ORAL 3 TIMES DAILY PRN
Status: DISCONTINUED | OUTPATIENT
Start: 2023-10-08 | End: 2023-10-17 | Stop reason: HOSPADM

## 2023-10-08 RX ORDER — POLYETHYLENE GLYCOL 3350 17 G
2 POWDER IN PACKET (EA) ORAL
Status: DISCONTINUED | OUTPATIENT
Start: 2023-10-08 | End: 2023-10-17 | Stop reason: HOSPADM

## 2023-10-08 RX ORDER — TRAZODONE HYDROCHLORIDE 50 MG/1
50 TABLET ORAL NIGHTLY PRN
Status: DISCONTINUED | OUTPATIENT
Start: 2023-10-08 | End: 2023-10-12

## 2023-10-08 RX ORDER — ALBUTEROL SULFATE 90 UG/1
2 AEROSOL, METERED RESPIRATORY (INHALATION) 4 TIMES DAILY PRN
Status: DISCONTINUED | OUTPATIENT
Start: 2023-10-08 | End: 2023-10-14

## 2023-10-08 RX ORDER — PALIPERIDONE 3 MG/1
6 TABLET, EXTENDED RELEASE ORAL DAILY
Status: DISCONTINUED | OUTPATIENT
Start: 2023-10-09 | End: 2023-10-09

## 2023-10-08 RX ADMIN — ACETAMINOPHEN 650 MG: 325 TABLET ORAL at 20:49

## 2023-10-08 RX ADMIN — LORAZEPAM 1 MG: 1 TABLET ORAL at 15:16

## 2023-10-08 RX ADMIN — OLANZAPINE 5 MG: 5 TABLET, FILM COATED ORAL at 22:23

## 2023-10-08 RX ADMIN — TRAZODONE HYDROCHLORIDE 50 MG: 50 TABLET ORAL at 20:41

## 2023-10-08 RX ADMIN — LORAZEPAM 1 MG: 1 TABLET ORAL at 05:28

## 2023-10-08 RX ADMIN — PALIPERIDONE 3 MG: 3 TABLET, EXTENDED RELEASE ORAL at 15:16

## 2023-10-08 RX ADMIN — HYDROXYZINE HYDROCHLORIDE 50 MG: 50 TABLET, FILM COATED ORAL at 20:41

## 2023-10-08 ASSESSMENT — PATIENT HEALTH QUESTIONNAIRE - PHQ9
8. MOVING OR SPEAKING SO SLOWLY THAT OTHER PEOPLE COULD HAVE NOTICED. OR THE OPPOSITE, BEING SO FIGETY OR RESTLESS THAT YOU HAVE BEEN MOVING AROUND A LOT MORE THAN USUAL: 0
9. THOUGHTS THAT YOU WOULD BE BETTER OFF DEAD, OR OF HURTING YOURSELF: 3
5. POOR APPETITE OR OVEREATING: 3
SUM OF ALL RESPONSES TO PHQ QUESTIONS 1-9: 19
4. FEELING TIRED OR HAVING LITTLE ENERGY: 1
3. TROUBLE FALLING OR STAYING ASLEEP: 3
SUM OF ALL RESPONSES TO PHQ QUESTIONS 1-9: 22
1. LITTLE INTEREST OR PLEASURE IN DOING THINGS: 3
2. FEELING DOWN, DEPRESSED OR HOPELESS: 3
SUM OF ALL RESPONSES TO PHQ QUESTIONS 1-9: 22
6. FEELING BAD ABOUT YOURSELF - OR THAT YOU ARE A FAILURE OR HAVE LET YOURSELF OR YOUR FAMILY DOWN: 3
7. TROUBLE CONCENTRATING ON THINGS, SUCH AS READING THE NEWSPAPER OR WATCHING TELEVISION: 3
SUM OF ALL RESPONSES TO PHQ QUESTIONS 1-9: 22
SUM OF ALL RESPONSES TO PHQ9 QUESTIONS 1 & 2: 6
10. IF YOU CHECKED OFF ANY PROBLEMS, HOW DIFFICULT HAVE THESE PROBLEMS MADE IT FOR YOU TO DO YOUR WORK, TAKE CARE OF THINGS AT HOME, OR GET ALONG WITH OTHER PEOPLE: 3

## 2023-10-08 ASSESSMENT — PAIN DESCRIPTION - DESCRIPTORS: DESCRIPTORS: ACHING

## 2023-10-08 ASSESSMENT — SLEEP AND FATIGUE QUESTIONNAIRES
DO YOU USE A SLEEP AID: NO
DO YOU HAVE DIFFICULTY SLEEPING: YES
SLEEP PATTERN: DIFFICULTY FALLING ASLEEP;DISTURBED/INTERRUPTED SLEEP;NIGHTMARES/TERRORS

## 2023-10-08 ASSESSMENT — PAIN SCALES - GENERAL
PAINLEVEL_OUTOF10: 5
PAINLEVEL_OUTOF10: 0

## 2023-10-08 ASSESSMENT — PAIN - FUNCTIONAL ASSESSMENT: PAIN_FUNCTIONAL_ASSESSMENT: ACTIVITIES ARE NOT PREVENTED

## 2023-10-08 ASSESSMENT — PAIN DESCRIPTION - LOCATION: LOCATION: GENERALIZED

## 2023-10-08 NOTE — BH NOTE
Patient arrived to the unit via wheelchair from our ED accompanied by security and ER nurse. Patient offered snack and drink and greeted by staff.
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
## 2023-10-08 NOTE — ED NOTES
Call placed to [facility] spoke to Waqar per psych intake questions.      Jennifer Shafer RN  10/08/23 0028

## 2023-10-08 NOTE — ED NOTES
Pt taken to [facility] via wheelchair per writer and security in stable condition. Pts belongings given to [facility] in [address]      Luna Solano RN  10/08/23 7032
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
## 2023-10-08 NOTE — BH NOTE
Home Medication Reconciliation Status          [] COMPLETE       Medication history has been reviewed and obtained from the following source(s):       [] patient/family verbal report             [] patient/family provided written list       [] external pharmacy   [] external facility list         []  Provider notified that home medication reconciliation is complete          [] IN PROGRESS       Medication reconciliation marked in progress at this time due to:       [x] patient/family poor historian      [] waiting arrival of family to clarify       [] waiting for accurate list        [x] external pharmacy needs called      * Follow up is needed. [] UNABLE TO ASSESS       Medication reconciliation is incomplete and unable to assess at this time due to:       [] critical patient condition   [] patient is unresponsive        [] no family available                       [] unknown pharmacy       [] anonymous patient          * Follow up is needed.       [] Pharmacy consult placed for medication reconciliation assistance   Additional comments:

## 2023-10-08 NOTE — ED NOTES
Per provider, after evaluation of patient, sitter order has been DCed. Pt not flagging for sitter. Provider placed orders.       Paulette Kang  10/07/23 7366

## 2023-10-08 NOTE — H&P
Hospital Medicine History & Physical      PCP: None, None    Date of Admission: 10/7/2023    Date of Service: Pt seen/examined on 10/8/2023     Chief Complaint:    Chief Complaint   Patient presents with    Psychiatric Evaluation     Pt arrives via squad d/t having auditory/visual hallucinations per squad. Squad states pt has a hx of schizophrenia and has been off meds for an unknown amount of time. Pt confirms being out of meds and states she is staying at a friends house. Pt received 0.5mg versed en route. Pt denies hallucinations at this time. Pt denies S/I, H/I. History Of Present Illness: The patient is a 34 y.o. female with schizophrenia, depression, anxiety, and asthma who presented to [facility] ED with complaint of auditory/visual hallucinations. Patient was seen and evaluated in the ED by the ED medical provider, patient was medically cleared for admission to [facility] at [facility]. This note serves as an admission medical H&P.    PCP: None, None  Tobacco use: current  ETOH use: denies  Illicit drug use: methamphetamines; Tox positive for benzos, Cannabis    Patient denies any symptoms/complaints. Past Medical History:        Diagnosis Date    Anxiety     Asthma     Depression        Past Surgical History:        Procedure Laterality Date    BREAST SURGERY      EYE SURGERY         Medications Prior to Admission:    Prior to Admission medications    Medication Sig Start Date End Date Taking? Authorizing Provider   paliperidone (INVEGA) 6 MG extended release tablet Take 1 tablet by mouth daily  Patient not taking: Reported on 10/8/2023 4/5/23   INDIO Hodgson CNP   albuterol sulfate HFA (VENTOLIN HFA) 108 (90 Base) MCG/ACT inhaler Inhale 2 puffs into the lungs 4 times daily as needed for Wheezing 3/19/23   INDIO Ruelas CNP       Allergies:  Morphine    Social History:    TOBACCO:   reports that she has been smoking cigarettes. She has a 20.00 pack-year smoking history.  She has never used

## 2023-10-08 NOTE — BH NOTE
Found sitting in bed. When asked if she would like to receive a phone call. She shook her head in a yes fashion. The call was lost and not completed. Received the number if she would like to return the call. Declined noon meal. Sitting next to the phone. Facial expressions indicate uncertainty of understanding.

## 2023-10-08 NOTE — ED PROVIDER NOTES
Emergency Department Attending Provider Note  Location: [address] [place] [address] ED  10/7/2023     Chief Complaint   Patient presents with    Psychiatric Evaluation     Pt arrives via squad d/t having auditory/visual hallucinations per squad. Squad states pt has a hx of schizophrenia and has been off meds for an unknown amount of time. Pt confirms being out of meds and states she is staying at a friends house. Pt received 0.5mg versed en route. Pt denies hallucinations at this time. Pt denies S/I, H/I. PATIENT ID:  Corby Portillo is a 34 y.o. female    Past Medical History:   Diagnosis Date    Anxiety     Asthma     Depression        Corby Portillo was evaluated in the Emergency Department for concerns of hearing voices at home. Family and friends were concerned, because she had been talking to things but that were not in the room, and patient does state that she has been having visual hallucinations as well. She got 0.5 mg Versed in route and says she is doing okay right now. Denies any homicidal or suicidal ideation. Is not placed on a 72-hour hold. Has a history of schizophrenia. Is supposed to be on Invega but denies taking it right now, does not know how long she has been off it. Although initial history and physical exam information was obtained by midlevel provider (who also dictated a record of this visit), I personally saw the patient and performed a substantive portion of the visit including all aspects of the medical decision making. Vitals:    10/07/23 2345   BP: 112/67   Pulse: 84   Resp:    Temp:    SpO2: 98%        BASIC EXAM:  No acute distress. Interactive, conversational, pleasant, and does not appear to be responding to external stimuli on my exam.   No significant leg swelling.          No orders to display       Labs Reviewed   COMPREHENSIVE METABOLIC PANEL W/ REFLEX TO MG FOR LOW K - Abnormal; Notable for the following components:       Result Value    Sodium 131 (*)
Normocephalic and atraumatic. Nose: Nose normal.   Eyes:      General:         Right eye: No discharge. Left eye: No discharge. Cardiovascular:      Rate and Rhythm: Normal rate and regular rhythm. Pulses: Normal pulses. Heart sounds: No murmur heard. Pulmonary:      Effort: Pulmonary effort is normal. No respiratory distress. Breath sounds: No wheezing or rhonchi. Abdominal:      Palpations: Abdomen is soft. Tenderness: There is no abdominal tenderness. There is no guarding or rebound. Musculoskeletal:         General: Normal range of motion. Cervical back: Normal range of motion and neck supple. Right lower leg: No edema. Left lower leg: No edema. Skin:     General: Skin is warm and dry. Capillary Refill: Capillary refill takes less than 2 seconds. Neurological:      General: No focal deficit present. Mental Status: She is alert and oriented to person, place, and time. Psychiatric:         Attention and Perception: She perceives auditory and visual hallucinations. Mood and Affect: Mood normal.         Behavior: Behavior normal.         Thought Content: Thought content does not include homicidal or suicidal ideation. Thought content does not include homicidal or suicidal plan.            DIAGNOSTIC RESULTS   LABS:    Labs Reviewed   COMPREHENSIVE METABOLIC PANEL W/ REFLEX TO MG FOR LOW K - Abnormal; Notable for the following components:       Result Value    Sodium 131 (*)     Chloride 95 (*)     Glucose 119 (*)     BUN 5 (*)     Creatinine <0.5 (*)     ALT <5 (*)     AST 12 (*)     All other components within normal limits   SALICYLATE LEVEL - Abnormal; Notable for the following components:    Salicylate, Serum <3.2 (*)     All other components within normal limits   ACETAMINOPHEN LEVEL - Abnormal; Notable for the following components:    Acetaminophen Level <5 (*)     All other components within normal limits   URINE DRUG SCREEN -

## 2023-10-08 NOTE — BH NOTE
Evening meal delivered to Sandro Saucedo in her assigned room. Sandro Saucedo has been sitting in a chair with her knees up. Able to verbalize easier with less pressured speech. Remains guarded.

## 2023-10-08 NOTE — PLAN OF CARE
Problem: Risk for Elopement  Goal: Patient will not exit the unit/facility without proper excort  Outcome: Progressing  Flowsheets (Taken 10/8/2023 1046)  Nursing Interventions for Elopement Risk: Assist with personal care needs such as toileting, eating, dressing, as needed to reduce the risk of wandering     Problem: Psychosis  Goal: Will report no hallucinations or delusions  Description: INTERVENTIONS:  1. Administer medication as  ordered  2. Assist with reality testing to support increasing orientation  3. Assess if patient's hallucinations or delusions are encouraging self harm or harm to others and intervene as appropriate  Outcome: Not Progressing     Problem: Anxiety  Goal: Will report anxiety at manageable levels  Description: INTERVENTIONS:  1. Administer medication as ordered  2. Teach and rehearse alternative coping skills  3. Provide emotional support with 1:1 interaction with staff  Outcome: Not Progressing  Flowsheets (Taken 10/8/2023 1046)  Will report anxiety at manageable levels:   Administer medication as ordered   Provide emotional support with 1:1 interaction with staff     Problem: Involuntary Admit  Goal: Will cooperate with staff recommendations and doctor's orders and will demonstrate appropriate behavior  Description: INTERVENTIONS:  1. Treat underlying conditions and offer medication as ordered  2. Educate regarding involuntary admission procedures and rules  3. Contain excessive/inappropriate behavior per unit and hospital policies  Outcome: Not Progressing  Flowsheets (Taken 10/8/2023 1046)  Will cooperate with staff recommendations and doctor's orders and will demonstrate appropriate behavior: Treat underlying conditions and offer medication as ordered     Problem: Pain  Goal: Verbalizes/displays adequate comfort level or baseline comfort level  Outcome: Not Progressing     Problem: Psychosis  Goal: Will report no hallucinations or delusions  Description: INTERVENTIONS:  1.

## 2023-10-08 NOTE — VIRTUAL HEALTH
Telepsych  Crisis Assessment       Chief Complaint: \"I was seeing things and hearing things\"    Diagnosis-Unspecified: unspecified psychosis     Voluntary/Involuntary Status: Involuntary     Guardian/POA: none    C-SSRS Risk (High, Moderate, Low): No risk    Psychosis (if present): Pt with paranoia \"I think they are killing people, they are killing kids\"    MH & Substance Use Treatment: Pt states she was supposed to take Risperidone \"I didn't have any medical coverage\" to get medicine. Pt with a psych admission in March this year. Substance Use: Pt shares she used to do drugs     Trauma/Abuse: unable to assess    Violence: unable to assess    Legal: Pt states she has a case with the restaurant [employer]; \"I think they caught me on camera stealing. \"    Access to Weapons: Pt denies     Risk Factors: chronic mental health issues     Protective Factors: help seeking     Support system: Pt denies having any support besides her 8year old son, states her mother . Living Situation: Was staying with a friend, currently homeless     Education: unable to assess    Employment: unable to assess    Brief Summary: Pt in bed, disheveled, at times incoherent, tangential thoughts. Record review shows some different information than shared which could be due to current mental status. Denies wanting to harm herself or any plan or intent. Pt informed that Psych NP would see her for additional assessment. Pt agreeable. Disposition: Patient to be referred to psychiatry for further evaluation. Safety Plan: Farida Pardo, was evaluated through a synchronous (real-time) audio-video encounter. The patient (and/or guardian if applicable) is aware that this is a billable service, which includes applicable co-pays. This virtual visit was conducted with patient's (and/or legal guardian's) consent. Patient identification was verified, and a caregiver was present when appropriate.   The patient was located at

## 2023-10-08 NOTE — BH NOTE
Received new medication and inst on what they were. Inst to swallow the medication with water. Indra Vásquez placed the medication in her mouth. Noted to move her tongue over the medication. Given additional water and inst to drink the water and swallow the medication which she did so. Inquired as to what part of [place] she was from \"I'm not from [place]\". When asked where she was from \"I don't remember\". Reported \"I'm anxious\". Repeated the medication would help with the anxiousness and she responded \"good\".

## 2023-10-09 PROCEDURE — 99233 SBSQ HOSP IP/OBS HIGH 50: CPT | Performed by: PSYCHIATRY & NEUROLOGY

## 2023-10-09 PROCEDURE — 1240000000 HC EMOTIONAL WELLNESS R&B

## 2023-10-09 PROCEDURE — 6370000000 HC RX 637 (ALT 250 FOR IP): Performed by: NURSE PRACTITIONER

## 2023-10-09 PROCEDURE — 6370000000 HC RX 637 (ALT 250 FOR IP): Performed by: PSYCHIATRY & NEUROLOGY

## 2023-10-09 PROCEDURE — 6360000002 HC RX W HCPCS: Performed by: PSYCHIATRY & NEUROLOGY

## 2023-10-09 RX ADMIN — PALIPERIDONE PALMITATE 117 MG: 117 INJECTION INTRAMUSCULAR at 14:30

## 2023-10-09 RX ADMIN — OLANZAPINE 5 MG: 5 TABLET, FILM COATED ORAL at 20:44

## 2023-10-09 RX ADMIN — TRAZODONE HYDROCHLORIDE 50 MG: 50 TABLET ORAL at 20:44

## 2023-10-09 RX ADMIN — PALIPERIDONE 6 MG: 3 TABLET, EXTENDED RELEASE ORAL at 09:14

## 2023-10-09 NOTE — H&P
[address], [address]                              HISTORY AND PHYSICAL    PATIENT NAME: Sharon Root                    :        1990  MED REC NO:   [id number]                          ROOM:       9263  ACCOUNT NO:   [de-identified]                           ADMIT DATE: 10/07/2023  PROVIDER:     Sammie Rodríguez MD    DATE OF EVALUATION:  10/08/2023    IDENTIFICATION:  This is a homeless, , and unemployed  19-year-old with a history of schizophrenia and substance use, who was  brought to our emergency department by squad after her friend called  because of worsening psychotic symptoms. SOURCES OF INFORMATION:  The patient. Focused record review. CHIEF COMPLAINT:  \"Something is going on. I don't feel safe anywhere. \"    HISTORY OF PRESENT ILLNESS:  In the emergency department, she reported  seeing shapes and people she does not recognize, and somehow believes  that her son is in danger. Here she presents severely disorganized, delayed, and impaired. She was  distracted for most of the evaluation with me. She made a number of odd and   delusional comments including that her son was being killed and she was living with someone  who wanted to take him away. She was admitted to our program in 2023 psychotic and was discharged  on Invega 6 mg daily. She continued to take  it for two or three more days and then stopped. She  did not follow up with outpatient treatment. She is clearly impaired and requires inpatient stabilization. PSYCHIATRIC REVIEW OF SYSTEMS:  No symptoms mao. She does have  multiple negative symptoms of schizophrenia. STRESSORS:  \"Everything. \"    PAST PSYCHIATRIC HISTORY:  She cannot remember how many  hospitalizations she has had, but our records indicate at least one  previous hospitalization which was here in 2023.   When asked about  previous suicide

## 2023-10-09 NOTE — BH NOTE
Morning meal taken in to pt. Awake and more responsive. Made good eye contact during interaction. When told she looked better today responded \"thank you\". Oriented Sandro Pry to the day, date and location. She was able to state this was \"this is [facility]". Medication compliant.

## 2023-10-09 NOTE — GROUP NOTE
Group Therapy Note    Date: 10/9/2023    Group Start Time: 1000  Group End Time: [address] [facility], [place], [facility]        Group Therapy Note    Attendees: 7    SW used active listening to engage in conversation while checking in with the Pt's. SW worked with the Pt's to set a goal for the week, identify 3 steps they can take to reach it, and skills they can use if they become upset or overwhelmed during the week. The group shared and discussed their goals. Notes: The Pt was present and alert throughout the group. The Pt was able to identify and discuss their goal for week. Status After Intervention:  Unchanged    Participation Level:  Active Listener and Interactive    Participation Quality: Appropriate, Attentive, and Sharing      Speech:  pressured      Thought Process/Content: Logical      Affective Functioning: Congruent      Mood: depressed      Level of consciousness:  Drowsy      Response to Learning: Able to verbalize current knowledge/experience and Able to verbalize/acknowledge new learning      Endings: None Reported    Modes of Intervention: Support, Socialization, and Exploration      Discipline Responsible: /Counselor      Signature:  ESDRAS Nixon, [place]

## 2023-10-09 NOTE — BH NOTE
Cooperative in receiving IM Invega. Received Oley Basket Sustenna  mg, in Lt deltoid. Inst on medication.

## 2023-10-09 NOTE — PLAN OF CARE
Problem: Risk for Elopement  Goal: Patient will not exit the unit/facility without proper excort  10/8/2023 2254 by Carlen Schilder, RN  Outcome: Not Progressing  10/8/2023 1317 by Ron Hansen LPN  Outcome: Progressing  Flowsheets (Taken 10/8/2023 1046)  Nursing Interventions for Elopement Risk: Assist with personal care needs such as toileting, eating, dressing, as needed to reduce the risk of wandering     Problem: Psychosis  Goal: Will report no hallucinations or delusions  Description: INTERVENTIONS:  1. Administer medication as  ordered  2. Assist with reality testing to support increasing orientation  3. Assess if patient's hallucinations or delusions are encouraging self harm or harm to others and intervene as appropriate  10/8/2023 2254 by Carlen Schilder, RN  Outcome: Progressing  10/8/2023 1317 by Ron Hansen LPN  Outcome: Not Progressing     Problem: Anxiety  Goal: Will report anxiety at manageable levels  Description: INTERVENTIONS:  1. Administer medication as ordered  2. Teach and rehearse alternative coping skills  3. Provide emotional support with 1:1 interaction with staff  10/8/2023 2254 by Carlen Schilder, RN  Outcome: Progressing  10/8/2023 1317 by Ron Hansen LPN  Outcome: Not Progressing  Flowsheets (Taken 10/8/2023 1046)  Will report anxiety at manageable levels:   Administer medication as ordered   Provide emotional support with 1:1 interaction with staff     Problem: Involuntary Admit  Goal: Will cooperate with staff recommendations and doctor's orders and will demonstrate appropriate behavior  Description: INTERVENTIONS:  1. Treat underlying conditions and offer medication as ordered  2. Educate regarding involuntary admission procedures and rules  3.  Contain excessive/inappropriate behavior per unit and hospital policies  95/6/[id number] by Carlen Schilder, RN  Outcome: Progressing  10/8/2023 1317 by Ron Hansen LPN  Outcome: Not Progressing  Flowsheets (Taken

## 2023-10-10 PROCEDURE — 99233 SBSQ HOSP IP/OBS HIGH 50: CPT | Performed by: PSYCHIATRY & NEUROLOGY

## 2023-10-10 PROCEDURE — 1240000000 HC EMOTIONAL WELLNESS R&B

## 2023-10-10 PROCEDURE — 6370000000 HC RX 637 (ALT 250 FOR IP): Performed by: PSYCHIATRY & NEUROLOGY

## 2023-10-10 PROCEDURE — 6370000000 HC RX 637 (ALT 250 FOR IP): Performed by: NURSE PRACTITIONER

## 2023-10-10 RX ADMIN — OLANZAPINE 5 MG: 5 TABLET, FILM COATED ORAL at 09:16

## 2023-10-10 RX ADMIN — HYDROXYZINE HYDROCHLORIDE 50 MG: 50 TABLET, FILM COATED ORAL at 09:16

## 2023-10-10 ASSESSMENT — SLEEP AND FATIGUE QUESTIONNAIRES
AVERAGE NUMBER OF SLEEP HOURS: 4
SLEEP PATTERN: DIFFICULTY FALLING ASLEEP
DO YOU USE A SLEEP AID: NO
DO YOU HAVE DIFFICULTY SLEEPING: YES

## 2023-10-10 NOTE — PLAN OF CARE
Problem: Risk for Elopement  Goal: Patient will not exit the unit/facility without proper excort  Outcome: Progressing     Problem: Psychosis  Goal: Will report no hallucinations or delusions  Description: INTERVENTIONS:  1. Administer medication as  ordered  2. Assist with reality testing to support increasing orientation  3. Assess if patient's hallucinations or delusions are encouraging self harm or harm to others and intervene as appropriate  Outcome: Progressing     Problem: Anxiety  Goal: Will report anxiety at manageable levels  Description: INTERVENTIONS:  1. Administer medication as ordered  2. Teach and rehearse alternative coping skills  3. Provide emotional support with 1:1 interaction with staff  Outcome: Progressing     Problem: Involuntary Admit  Goal: Will cooperate with staff recommendations and doctor's orders and will demonstrate appropriate behavior  Description: INTERVENTIONS:  1. Treat underlying conditions and offer medication as ordered  2. Educate regarding involuntary admission procedures and rules  3. Contain excessive/inappropriate behavior per unit and hospital policies  Outcome: Progressing     Problem: Pain  Goal: Verbalizes/displays adequate comfort level or baseline comfort level  Outcome: Cata Garcia is alert and oriented x 3 tonight, there is some improvement. She continues to look fearful and appears scared on the unit. She denied any concerns. She denied SI/HI,admits occasional auditory and visual hallucinations. Stas has been cooperative with care. She agrees to come to the staff if thoughts of self harm were to occur. Safety checks continue Q 15 minutes through out the shift.

## 2023-10-10 NOTE — PLAN OF CARE
Problem: Psychosis  Goal: Will report no hallucinations or delusions  Description: INTERVENTIONS:  1. Administer medication as  ordered  2. Assist with reality testing to support increasing orientation  3. Assess if patient's hallucinations or delusions are encouraging self harm or harm to others and intervene as appropriate  10/10/2023 1235 by Salinas Arias RN  Outcome: Progressing     Problem: Anxiety  Goal: Will report anxiety at manageable levels  Description: INTERVENTIONS:  1. Administer medication as ordered  2. Teach and rehearse alternative coping skills  3. Provide emotional support with 1:1 interaction with staff  10/10/2023 1235 by Salinas Arias RN  Outcome: Progressing   Patient start of shift was in her room complaining of not sleeping last night and needing to rest. She did complain of voices and noises and feeling like she is freaking out. She did appear to be miserable and tired from the IS. She was offered prns and with encouragement did take them. When staff later went to ask her if she was feeling better and the voices were less. She was selectively mute and walked pass staff and has been sitting in the chairs watching TV. She did not eat breakfast or lunch. She is A&Ox2 to self and place. She does not seem to be aware of time and situation. When staff asked her to sign her admission forms, every form staff asked her to sign. Her response was \"if I sign this then I can go\"? Staff informed her no and then she was confused and overwhelmed with the few forms to sign that staff stopped. She denies that she wants to harm herself or others. She did talk this morning about voices. She did not interact much with staff to determine if she is delusional, but she is guarded and suspicious of staff.

## 2023-10-11 PROCEDURE — 6370000000 HC RX 637 (ALT 250 FOR IP): Performed by: PSYCHIATRY & NEUROLOGY

## 2023-10-11 PROCEDURE — 1240000000 HC EMOTIONAL WELLNESS R&B

## 2023-10-11 PROCEDURE — 99233 SBSQ HOSP IP/OBS HIGH 50: CPT | Performed by: PSYCHIATRY & NEUROLOGY

## 2023-10-11 RX ORDER — LORAZEPAM 1 MG/1
1 TABLET ORAL ONCE
Status: COMPLETED | OUTPATIENT
Start: 2023-10-11 | End: 2023-10-11

## 2023-10-11 RX ORDER — HALOPERIDOL 5 MG/1
5 TABLET ORAL ONCE
Status: COMPLETED | OUTPATIENT
Start: 2023-10-11 | End: 2023-10-11

## 2023-10-11 RX ADMIN — LORAZEPAM 1 MG: 1 TABLET ORAL at 11:26

## 2023-10-11 RX ADMIN — HALOPERIDOL 5 MG: 5 TABLET ORAL at 11:26

## 2023-10-11 RX ADMIN — TRAZODONE HYDROCHLORIDE 50 MG: 50 TABLET ORAL at 22:12

## 2023-10-11 RX ADMIN — ACETAMINOPHEN 650 MG: 325 TABLET ORAL at 22:18

## 2023-10-11 RX ADMIN — TRAZODONE HYDROCHLORIDE 50 MG: 50 TABLET ORAL at 22:11

## 2023-10-11 ASSESSMENT — PAIN SCALES - GENERAL: PAINLEVEL_OUTOF10: 6

## 2023-10-11 ASSESSMENT — PAIN DESCRIPTION - LOCATION
LOCATION: NECK
LOCATION: NECK

## 2023-10-11 ASSESSMENT — PAIN - FUNCTIONAL ASSESSMENT: PAIN_FUNCTIONAL_ASSESSMENT: ACTIVITIES ARE NOT PREVENTED

## 2023-10-11 ASSESSMENT — PAIN DESCRIPTION - ORIENTATION: ORIENTATION: POSTERIOR

## 2023-10-11 ASSESSMENT — PAIN DESCRIPTION - DESCRIPTORS: DESCRIPTORS: ACHING

## 2023-10-11 NOTE — PLAN OF CARE
Problem: Risk for Elopement  Goal: Patient will not exit the unit/facility without proper excort  Outcome: Progressing     Problem: Psychosis  Goal: Will report no hallucinations or delusions  Description: INTERVENTIONS:  1. Administer medication as  ordered  2. Assist with reality testing to support increasing orientation  3. Assess if patient's hallucinations or delusions are encouraging self harm or harm to others and intervene as appropriate  Outcome: Progressing     Problem: Anxiety  Goal: Will report anxiety at manageable levels  Description: INTERVENTIONS:  1. Administer medication as ordered  2. Teach and rehearse alternative coping skills  3. Provide emotional support with 1:1 interaction with staff  Outcome: Progressing     Problem: Involuntary Admit  Goal: Will cooperate with staff recommendations and doctor's orders and will demonstrate appropriate behavior  Description: INTERVENTIONS:  1. Treat underlying conditions and offer medication as ordered  2. Educate regarding involuntary admission procedures and rules  3. Contain excessive/inappropriate behavior per unit and hospital policies  Outcome: Progressing    Pt alert and oriented x1. Pt denies SI/HI/AVH. Pt appears to RTIS. Pt paranoid and being to cry during assessment. Pt stated \" I am going to need to change my first and last name as well as my kids\" Pt continued to cry and did not want to continue talking with nurse. Pt then went out to DR to watch tv where she appeared to calm down. Pt denies any further needs at this time.

## 2023-10-11 NOTE — GROUP NOTE
Group Therapy Note    Date: 10/11/2023    Group Start Time: 1300  Group End Time: 8324  Group Topic: [address] Therapy Note    Topic: Give Yourself Credit, Give Yourself Delores    Mode of Intervention: Rosa Isela Analysis    Song Used: Amada by Norma Christianson    Attendees: 8     Notes:  Carmenza Aragon presented in group with neutral/sad affect. Tearful during music presentation in session. Reflective while acknowledging her lapses in giving self positive acknowledgment for achievements. Discussed losses of relationships, self-perceived stability, and motivation to engage in self-care. Positive and collaborative with peers throughout. Met goal for group. Status After Intervention:  Improved    Participation Level:  Active Listener and Interactive    Participation Quality: Appropriate, Sharing, and Supportive      Speech:  normal      Thought Process/Content: Logical      Affective Functioning: Congruent      Mood: euthymic      Level of consciousness:  Alert and Attentive      Response to Learning: Able to verbalize current knowledge/experience, Capable of insight, and Progressing to goal      Endings: None Reported    Modes of Intervention: Support, Socialization, Exploration, Problem-solving, Activity, and Media      Discipline Responsible: Psychoeducational Specialist      Signature:  Emilia Gallegos, MM, MT-BC

## 2023-10-11 NOTE — PLAN OF CARE
Problem: Psychosis  Goal: Will report no hallucinations or delusions  Description: INTERVENTIONS:  1. Administer medication as  ordered  2. Assist with reality testing to support increasing orientation  3. Assess if patient's hallucinations or delusions are encouraging self harm or harm to others and intervene as appropriate  10/10/2023 2109 by Venessa Lucas RN  Outcome: Progressing  10/10/2023 1235 by Shani Roberson RN  Outcome: Progressing     Problem: Anxiety  Goal: Will report anxiety at manageable levels  Description: INTERVENTIONS:  1. Administer medication as ordered  2. Teach and rehearse alternative coping skills  3.  Provide emotional support with 1:1 interaction with staff  10/10/2023 2109 by Venessa Lucas RN  Outcome: Progressing  10/10/2023 1235 by Shani Roberson RN  Outcome: Progressing     Problem: Pain  Goal: Verbalizes/displays adequate comfort level or baseline comfort level  Outcome: Progressing

## 2023-10-12 PROCEDURE — 99233 SBSQ HOSP IP/OBS HIGH 50: CPT | Performed by: PSYCHIATRY & NEUROLOGY

## 2023-10-12 PROCEDURE — 6370000000 HC RX 637 (ALT 250 FOR IP): Performed by: PSYCHIATRY & NEUROLOGY

## 2023-10-12 PROCEDURE — 1240000000 HC EMOTIONAL WELLNESS R&B

## 2023-10-12 RX ORDER — TRAZODONE HYDROCHLORIDE 50 MG/1
50 TABLET ORAL NIGHTLY
Status: DISCONTINUED | OUTPATIENT
Start: 2023-10-12 | End: 2023-10-17 | Stop reason: HOSPADM

## 2023-10-12 RX ORDER — LORAZEPAM 1 MG/1
1 TABLET ORAL 2 TIMES DAILY
Status: DISCONTINUED | OUTPATIENT
Start: 2023-10-12 | End: 2023-10-17

## 2023-10-12 RX ORDER — HALOPERIDOL 5 MG/1
5 TABLET ORAL 2 TIMES DAILY
Status: DISCONTINUED | OUTPATIENT
Start: 2023-10-12 | End: 2023-10-17 | Stop reason: HOSPADM

## 2023-10-12 RX ORDER — BENZTROPINE MESYLATE 1 MG/1
1 TABLET ORAL EVERY 6 HOURS PRN
Status: DISCONTINUED | OUTPATIENT
Start: 2023-10-12 | End: 2023-10-17 | Stop reason: HOSPADM

## 2023-10-12 RX ORDER — BENZTROPINE MESYLATE 1 MG/ML
1 INJECTION INTRAMUSCULAR; INTRAVENOUS EVERY 6 HOURS PRN
Status: DISCONTINUED | OUTPATIENT
Start: 2023-10-12 | End: 2023-10-17 | Stop reason: HOSPADM

## 2023-10-12 RX ADMIN — LORAZEPAM 1 MG: 1 TABLET ORAL at 12:05

## 2023-10-12 RX ADMIN — ACETAMINOPHEN 650 MG: 325 TABLET ORAL at 18:13

## 2023-10-12 RX ADMIN — HALOPERIDOL 5 MG: 5 TABLET ORAL at 12:05

## 2023-10-12 ASSESSMENT — PAIN DESCRIPTION - LOCATION: LOCATION: HEAD

## 2023-10-12 ASSESSMENT — PAIN SCALES - GENERAL
PAINLEVEL_OUTOF10: 4
PAINLEVEL_OUTOF10: 0

## 2023-10-12 ASSESSMENT — PAIN DESCRIPTION - DESCRIPTORS: DESCRIPTORS: ACHING

## 2023-10-12 NOTE — FLOWSHEET NOTE
10/12/23 1218   Mental Status and Behavioral Exam   Normal No   Level of Assistance Independent/Self   Facial Expression Worried; Sad   Affect Unstable   Level of Consciousness Alert   Frequency of Checks 4 times per hour, close   Mood:Normal No   Mood Anxious; Labile; Suspicious;Sad;Irritable   Motor Activity:Normal No   Motor Activity Decreased   Eye Contact Good   Observed Behavior Withdrawn; Impulsive; Cooperative;Guarded   Sexual Misconduct History Current - no   Preception [place] to person   Attention:Normal No   Attention Unable to concentrate;Distractible   Thought Processes Perseveration   Thought Content:Normal No   Thought Content Paranoia   Depression Symptoms Isolative; Increased irritability; Impaired concentration; Feelings of hopelessess; Feelings of helplessness   Anxiety Symptoms Generalized   Chantell Symptoms Poor judgment;Labile   Hallucinations None   Delusions Yes   Delusions Paranoid;Persecutory   Memory:Normal No   Memory Confabulation;Poor recent; Poor remote   Insight and Judgment No   Insight and Judgment Poor judgment;Poor insight

## 2023-10-12 NOTE — PLAN OF CARE
Problem: Psychosis  Goal: Will report no hallucinations or delusions  Description: INTERVENTIONS:  1. Administer medication as  ordered  2. Assist with reality testing to support increasing orientation  3. Assess if patient's hallucinations or delusions are encouraging self harm or harm to others and intervene as appropriate  10/12/2023 0245 by Nery Chino RN  Outcome: Not Progressing  10/11/2023 1324 by Daniel Vera RN  Outcome: Progressing     Problem: Anxiety  Goal: Will report anxiety at manageable levels  Description: INTERVENTIONS:  1. Administer medication as ordered  2. Teach and rehearse alternative coping skills  3. Provide emotional support with 1:1 interaction with staff  10/12/2023 0245 by Nery Chino RN  Outcome: Not Progressing  Flowsheets (Taken 10/12/2023 0222)  Will report anxiety at manageable levels:   Administer medication as ordered   Teach and rehearse alternative coping skills   Provide emotional support with 1:1 interaction with staff  10/11/2023 1324 by Daniel Vera RN  Outcome: Progressing     Problem: Pain  Goal: Verbalizes/displays adequate comfort level or baseline comfort level  Outcome: Not Progressing   Pt had Tylenol 650 mg at 2218 for pain in the back of her neck. Feels like it was from her falling asleep sitting up in the chair. Pt seemed to get good results from the Tylenol. Pt voiced no other concern and slept after having Trazodone 50 mg po. Pt denies AVH, SI, but appears very preoccupied. And paranoid. Pt has had not appeared to be contemplating elopement. She doesn't seem to be watching staff or milling around any of the doors. Pt seems sad and preoccupied. Did not note whether pt had snack tonight. Seems that her anxiety is fairly high, probably due to her paranoia. Overall though pt said very little she was cooperative.

## 2023-10-12 NOTE — PLAN OF CARE
Problem: Psychosis  Goal: Will report no hallucinations or delusions  Description: INTERVENTIONS:  1. Administer medication as  ordered  2. Assist with reality testing to support increasing orientation  3. Assess if patient's hallucinations or delusions are encouraging self harm or harm to others and intervene as appropriate  10/12/2023 1320 by Viridiana Mallory RN  Outcome: Not Progressing       Problem: Anxiety  Goal: Will report anxiety at manageable levels  Description: INTERVENTIONS:  1. Administer medication as ordered  2. Teach and rehearse alternative coping skills  3.  Provide emotional support with 1:1 interaction with staff  10/12/2023 1425 by Viridiana Mallory RN  Outcome: Not Progressing  Problem: Pain  Goal: Verbalizes/displays adequate comfort level or baseline comfort level  10/12/2023 1425 by Viridiana Mallory RN  Outcome: Progressing

## 2023-10-13 PROCEDURE — 6370000000 HC RX 637 (ALT 250 FOR IP): Performed by: PSYCHIATRY & NEUROLOGY

## 2023-10-13 PROCEDURE — 1240000000 HC EMOTIONAL WELLNESS R&B

## 2023-10-13 PROCEDURE — 99233 SBSQ HOSP IP/OBS HIGH 50: CPT | Performed by: PSYCHIATRY & NEUROLOGY

## 2023-10-13 PROCEDURE — 6360000002 HC RX W HCPCS: Performed by: PSYCHIATRY & NEUROLOGY

## 2023-10-13 RX ADMIN — HALOPERIDOL 5 MG: 5 TABLET ORAL at 09:13

## 2023-10-13 RX ADMIN — TRAZODONE HYDROCHLORIDE 50 MG: 50 TABLET ORAL at 20:35

## 2023-10-13 RX ADMIN — LORAZEPAM 1 MG: 1 TABLET ORAL at 20:35

## 2023-10-13 RX ADMIN — PALIPERIDONE PALMITATE 117 MG: 117 INJECTION INTRAMUSCULAR at 09:13

## 2023-10-13 RX ADMIN — LORAZEPAM 1 MG: 1 TABLET ORAL at 09:13

## 2023-10-13 RX ADMIN — HALOPERIDOL 5 MG: 5 TABLET ORAL at 20:35

## 2023-10-13 ASSESSMENT — PAIN SCALES - GENERAL
PAINLEVEL_OUTOF10: 0
PAINLEVEL_OUTOF10: 0

## 2023-10-13 NOTE — DISCHARGE INSTRUCTIONS
Advanced Directives:  Patient does not have a surrogate decision maker appointed   Name (if yes): N/A Phone Number: N/A  Patient does not have a psychiatric and/ or medical advanced directive or power of . Patient was offered psychiatric and/ or medical advanced directive or power of  information/completion but declined to complete   Why not? N/A    Discharge Planning is Complete. Discharge Date: 10/17/23   Reason for Hospitalization: The patient is a 34 y.o. female with schizophrenia, depression, anxiety, and asthma who presented to [facility] ED with complaint of auditory/visual hallucinations. Discharge Diagnosis: Disorganized schizophrenia [facility])  Discharging to: Home    Your instructions: Your physician here was Bradly Coelho MD. If you have any questions please call the unit at [phone] for the adult unit or [phone] for [facility]. Please note that we have a patient family advisory [employer] [place] that meets the second Wednesday of January and the second Wednesday of July at [address] [place] [address] in [place] at [facility]. Department leadership would love for you to attend to give feedback on what we are doing well and areas in which we can improve our patient care. Please come if you are able and feel free to reach out to [address] [place] [address] at [phone] with any questions. The crisis number for [facility] is [phone] (SAVE). This crisis line is available 24 hours a day, seven days a week. Please follow up with your PCP regarding any pending labs. You are being referred to [facility]. They provide case management, medication management, and mental health therapy. See below.    Name of Provider: [address] [place] [address]  Provider specialty/license: mental health services  Date and time of appointment: 10/19/23 8:30 AM <Suggested Walk-in time  The type/s of services requested are: counseling/medication management  Agency name: [facility]

## 2023-10-13 NOTE — BH NOTE
Fabricio friend Juancho Mclean called patient. After phone call patient states \"He asked me to talk to you about signing myself out. \" Discussed with Sharon Castañeda that I, and the care team, ultimately think that is a poor decision based off her current presentation and mental health. She agreed and returned to her room.

## 2023-10-13 NOTE — BH NOTE
Patient behavior labile. At desk tearful while on the phone with significant other. Remains withdrawn to room.

## 2023-10-13 NOTE — PLAN OF CARE
Sandro Saucedo was asleep the entire shift. This writer attempted to wake her by saying her name loudly and rubbing/shaking her arm, and her eyelids fluttered open and then closed. She could not stay awake to take scheduled nighttime medications, so they were held. Her vitals are stable and respirations are even and unlabored. Sandro Saucedo denied SI, HI, and AVH by shaking her head \"no\". Problem: Risk for Elopement  Goal: Patient will not exit the unit/facility without proper excort  Outcome: Progressing     Problem: Psychosis  Goal: Will report no hallucinations or delusions  Description: INTERVENTIONS:  1. Administer medication as  ordered  2. Assist with reality testing to support increasing orientation  3. Assess if patient's hallucinations or delusions are encouraging self harm or harm to others and intervene as appropriate  10/13/2023 0058 by Christa Stokes RN  Outcome: Progressing     Problem: Anxiety  Goal: Will report anxiety at manageable levels  Description: INTERVENTIONS:  1. Administer medication as ordered  2. Teach and rehearse alternative coping skills  3. Provide emotional support with 1:1 interaction with staff  10/13/2023 0058 by Christa Stokes RN  Outcome: Progressing     Problem: Involuntary Admit  Goal: Will cooperate with staff recommendations and doctor's orders and will demonstrate appropriate behavior  Description: INTERVENTIONS:  1. Treat underlying conditions and offer medication as ordered  2. Educate regarding involuntary admission procedures and rules  3.  Contain excessive/inappropriate behavior per unit and hospital policies  10/07/8660 9973 by Christa Stokes RN  Outcome: Progressing     Problem: Pain  Goal: Verbalizes/displays adequate comfort level or baseline comfort level  10/13/2023 0058 by Christa Stokes RN  Outcome: Progressing

## 2023-10-13 NOTE — BH NOTE
Paige Doing appears improved today. She is up eating breakfast in her room. Remains isolative. She has significant thought blocking and appears preoccupied. States that she is still hearing voices however they are not constant. She can not make out what they are saying, states it is just continuous chatter. States she was able to sleep last night and does appear rested this morning. Remains wide eyed and appears scared and nervous. Compliant with all medications.

## 2023-10-13 NOTE — PLAN OF CARE
Problem: Risk for Elopement  Goal: Patient will not exit the unit/facility without proper excort  10/13/2023 1023 by Dwayne Ghotra RN  Outcome: Completed     Patient is a voluntary admission and agreeable to treatment. She is not an elopement risk at this time.

## 2023-10-14 ENCOUNTER — APPOINTMENT (OUTPATIENT)
Dept: GENERAL RADIOLOGY | Age: 33
DRG: 750 | End: 2023-10-14
Payer: MEDICAID

## 2023-10-14 LAB
ANION GAP SERPL CALCULATED.3IONS-SCNC: 9 MMOL/L (ref 3–16)
BASOPHILS # BLD: 0 K/UL (ref 0–0.2)
BASOPHILS NFR BLD: 0.9 %
BUN SERPL-MCNC: 11 MG/DL (ref 7–20)
CALCIUM SERPL-MCNC: 8.6 MG/DL (ref 8.3–10.6)
CHLORIDE SERPL-SCNC: 105 MMOL/L (ref 99–110)
CO2 SERPL-SCNC: 24 MMOL/L (ref 21–32)
CREAT SERPL-MCNC: 0.6 MG/DL (ref 0.6–1.1)
DEPRECATED RDW RBC AUTO: 13.1 % (ref 12.4–15.4)
EOSINOPHIL # BLD: 0.1 K/UL (ref 0–0.6)
EOSINOPHIL NFR BLD: 1.2 %
GFR SERPLBLD CREATININE-BSD FMLA CKD-EPI: >60 ML/MIN/{1.73_M2}
GLUCOSE SERPL-MCNC: 96 MG/DL (ref 70–99)
HCT VFR BLD AUTO: 38.6 % (ref 36–48)
HGB BLD-MCNC: 13.1 G/DL (ref 12–16)
LACTATE BLDV-SCNC: 1.6 MMOL/L (ref 0.4–2)
LYMPHOCYTES # BLD: 1.6 K/UL (ref 1–5.1)
LYMPHOCYTES NFR BLD: 30.5 %
MCH RBC QN AUTO: 30.7 PG (ref 26–34)
MCHC RBC AUTO-ENTMCNC: 33.8 G/DL (ref 31–36)
MCV RBC AUTO: 91 FL (ref 80–100)
MONOCYTES # BLD: 0.4 K/UL (ref 0–1.3)
MONOCYTES NFR BLD: 7.3 %
NEUTROPHILS # BLD: 3.1 K/UL (ref 1.7–7.7)
NEUTROPHILS NFR BLD: 60.1 %
PLATELET # BLD AUTO: 273 K/UL (ref 135–450)
PMV BLD AUTO: 8.2 FL (ref 5–10.5)
POTASSIUM SERPL-SCNC: 4.2 MMOL/L (ref 3.5–5.1)
PROCALCITONIN SERPL IA-MCNC: 0.03 NG/ML (ref 0–0.15)
RBC # BLD AUTO: 4.25 M/UL (ref 4–5.2)
SODIUM SERPL-SCNC: 138 MMOL/L (ref 136–145)
WBC # BLD AUTO: 5.1 K/UL (ref 4–11)

## 2023-10-14 PROCEDURE — 6370000000 HC RX 637 (ALT 250 FOR IP): Performed by: PSYCHIATRY & NEUROLOGY

## 2023-10-14 PROCEDURE — 87205 SMEAR GRAM STAIN: CPT

## 2023-10-14 PROCEDURE — 99232 SBSQ HOSP IP/OBS MODERATE 35: CPT | Performed by: NURSE PRACTITIONER

## 2023-10-14 PROCEDURE — 99232 SBSQ HOSP IP/OBS MODERATE 35: CPT

## 2023-10-14 PROCEDURE — 36415 COLL VENOUS BLD VENIPUNCTURE: CPT

## 2023-10-14 PROCEDURE — 84145 PROCALCITONIN (PCT): CPT

## 2023-10-14 PROCEDURE — 83605 ASSAY OF LACTIC ACID: CPT

## 2023-10-14 PROCEDURE — 80048 BASIC METABOLIC PNL TOTAL CA: CPT

## 2023-10-14 PROCEDURE — 94761 N-INVAS EAR/PLS OXIMETRY MLT: CPT

## 2023-10-14 PROCEDURE — 94640 AIRWAY INHALATION TREATMENT: CPT

## 2023-10-14 PROCEDURE — 85025 COMPLETE CBC W/AUTO DIFF WBC: CPT

## 2023-10-14 PROCEDURE — 71045 X-RAY EXAM CHEST 1 VIEW: CPT

## 2023-10-14 PROCEDURE — 87070 CULTURE OTHR SPECIMN AEROBIC: CPT

## 2023-10-14 PROCEDURE — 1240000000 HC EMOTIONAL WELLNESS R&B

## 2023-10-14 PROCEDURE — 6370000000 HC RX 637 (ALT 250 FOR IP)

## 2023-10-14 RX ORDER — ALBUTEROL SULFATE 90 UG/1
2 AEROSOL, METERED RESPIRATORY (INHALATION)
Status: DISCONTINUED | OUTPATIENT
Start: 2023-10-14 | End: 2023-10-17 | Stop reason: HOSPADM

## 2023-10-14 RX ORDER — GUAIFENESIN 600 MG/1
600 TABLET, EXTENDED RELEASE ORAL 2 TIMES DAILY PRN
Status: DISCONTINUED | OUTPATIENT
Start: 2023-10-14 | End: 2023-10-17 | Stop reason: HOSPADM

## 2023-10-14 RX ORDER — ALBUTEROL SULFATE 90 UG/1
2 AEROSOL, METERED RESPIRATORY (INHALATION)
Status: DISCONTINUED | OUTPATIENT
Start: 2023-10-14 | End: 2023-10-14

## 2023-10-14 RX ORDER — ONDANSETRON 4 MG/1
4 TABLET, ORALLY DISINTEGRATING ORAL EVERY 8 HOURS PRN
Status: DISCONTINUED | OUTPATIENT
Start: 2023-10-14 | End: 2023-10-17 | Stop reason: HOSPADM

## 2023-10-14 RX ADMIN — LORAZEPAM 1 MG: 1 TABLET ORAL at 20:37

## 2023-10-14 RX ADMIN — HALOPERIDOL 5 MG: 5 TABLET ORAL at 09:39

## 2023-10-14 RX ADMIN — TRAZODONE HYDROCHLORIDE 50 MG: 50 TABLET ORAL at 20:37

## 2023-10-14 RX ADMIN — HALOPERIDOL 5 MG: 5 TABLET ORAL at 20:37

## 2023-10-14 RX ADMIN — Medication 2 PUFF: at 20:06

## 2023-10-14 RX ADMIN — Medication 2 PUFF: at 15:54

## 2023-10-14 RX ADMIN — LORAZEPAM 1 MG: 1 TABLET ORAL at 09:39

## 2023-10-14 ASSESSMENT — PAIN SCALES - GENERAL: PAINLEVEL_OUTOF10: 0

## 2023-10-14 NOTE — FLOWSHEET NOTE
10/14/23 1000   Mental Status and Behavioral Exam   Normal No   Level of Assistance Independent/Self   Facial Expression Expressionless   Affect Unstable   Level of Consciousness Alert   Frequency of Checks 4 times per hour, close   Mood:Normal No   Mood Anxious; Suspicious;Sad;Terrified   Motor Activity:Normal No   Motor Activity Decreased   Eye Contact Fair   Observed Behavior Withdrawn; Impulsive;Guarded;Tearful   Sexual Misconduct History Current - no   Preception [place] to person;[place] to time;[place] to place   Attention:Normal No   Attention Unable to concentrate;Distractible   Thought Processes Blocking   Thought Content:Normal No   Thought Content Paranoia;Delusions   Depression Symptoms Crying;Isolative   Anxiety Symptoms Feelings of doom; Generalized; Unexplained fears   Chantell Symptoms No problems reported or observed. Hallucinations Auditory (comment)   Delusions Yes   Delusions Paranoid;Persecutory   Memory:Normal No   Memory Confabulation;Poor recent; Poor remote   Insight and Judgment No   Insight and Judgment Poor judgment;Poor insight

## 2023-10-14 NOTE — PLAN OF CARE
Problem: Psychosis  Goal: Will report no hallucinations or delusions  Description: INTERVENTIONS:  1. Administer medication as  ordered  2. Assist with reality testing to support increasing orientation  3. Assess if patient's hallucinations or delusions are encouraging self harm or harm to others and intervene as appropriate  Outcome: Progressing     Problem: Pain  Goal: Verbalizes/displays adequate comfort level or baseline comfort level  Outcome: Progressing   Patient visible on unit 1 x for limited time. Withdrawn to room. Patient alert to self. Patient denies all. Verbalized feelings of wooziness with meds earlier. Pleasant and cooperative with assessment and meds. Denies all.

## 2023-10-14 NOTE — BH NOTE
Pt has been resting in bed most of shift. Pt is pale, states she does not feel well. Pt did come out and get her morning medications and breakfast and ate well.

## 2023-10-14 NOTE — BH NOTE
[facility]  Treatment Team Note  Review Date & Time: 10/14/2023  1016    Patient was not in treatment team      Status EXAM:   Mental Status and Behavioral Exam  Normal: No  Level of Assistance: Independent/Self  Facial Expression: Expressionless  Affect: Unstable  Level of Consciousness: Alert  Frequency of Checks: 4 times per hour, close  Mood:Normal: No  Mood: Anxious, Suspicious, Sad, Terrified  Motor Activity:Normal: No  Motor Activity: Decreased  Eye Contact: Fair  Observed Behavior: Withdrawn, Impulsive, Guarded, Tearful  Sexual Misconduct History: Current - no  Preception: [place] to person, [place] to time, [place] to place  Attention:Normal: No  Attention: Unable to concentrate, Distractible  Thought Processes: Blocking  Thought Content:Normal: No  Thought Content: Paranoia, Delusions  Depression Symptoms: Crying, Isolative  Anxiety Symptoms: Feelings of doom, Generalized, Unexplained fears  Chantell Symptoms: No problems reported or observed. Hallucinations: Auditory (comment)  Delusions: Yes  Delusions: Paranoid, Persecutory  Memory:Normal: No  Memory: Confabulation, Poor recent, Poor remote  Insight and Judgment: No  Insight and Judgment: Poor judgment, Poor insight      Suicide Risk CSSR-S:  1) Within the past month, have you wished you were dead or wished you could go to sleep and not wake up? : No  2) Have you actually had any thoughts of killing yourself? : No  6) Have you ever done anything, started to do anything, or prepared to do anything to end your life?: No      PLAN/TREATMENT RECOMMENDATIONS UPDATE: Patient will take medication as prescribed, eat 75% of meals, attend groups, participate in milieu activities, participate in treatment team and care planning for discharge and follow up.             Alem Ng RN

## 2023-10-14 NOTE — PLAN OF CARE
Problem: Risk for Elopement  Goal: Patient will not exit the unit/facility without proper excort  Recent Flowsheet Documentation  Taken 10/14/2023 1040 by Sara Hickey RN  Nursing Interventions for Elopement Risk: Assist with personal care needs such as toileting, eating, dressing, as needed to reduce the risk of wandering  Problem: Psychosis  Goal: Will report no hallucinations or delusions  Description: INTERVENTIONS:  1. Administer medication as  ordered  2. Assist with reality testing to support increasing orientation  3. Assess if patient's hallucinations or delusions are encouraging self harm or harm to others and intervene as appropriate  10/14/2023 1056 by Sara Hickey, RN  Outcome: Not Progressing     Problem: Anxiety  Goal: Will report anxiety at manageable levels  Description: INTERVENTIONS:  1. Administer medication as ordered  2. Teach and rehearse alternative coping skills  3.  Provide emotional support with 1:1 interaction with staff  Outcome: Not Progressing  Problem: Pain  Goal: Verbalizes/displays adequate comfort level or baseline comfort level  10/14/2023 1056 by Sara Hickey RN  Outcome: Progressing

## 2023-10-14 NOTE — BH NOTE
BP trending down, HR trending up. Adventitious lung sounds noted. Reported to [facility].

## 2023-10-15 PROCEDURE — 1240000000 HC EMOTIONAL WELLNESS R&B

## 2023-10-15 PROCEDURE — 6370000000 HC RX 637 (ALT 250 FOR IP): Performed by: PSYCHIATRY & NEUROLOGY

## 2023-10-15 PROCEDURE — 94640 AIRWAY INHALATION TREATMENT: CPT

## 2023-10-15 PROCEDURE — 94761 N-INVAS EAR/PLS OXIMETRY MLT: CPT

## 2023-10-15 RX ORDER — ALBUTEROL SULFATE 90 UG/1
2 AEROSOL, METERED RESPIRATORY (INHALATION) EVERY 4 HOURS PRN
Status: DISCONTINUED | OUTPATIENT
Start: 2023-10-15 | End: 2023-10-17 | Stop reason: HOSPADM

## 2023-10-15 RX ADMIN — HALOPERIDOL 5 MG: 5 TABLET ORAL at 08:30

## 2023-10-15 RX ADMIN — TRAZODONE HYDROCHLORIDE 50 MG: 50 TABLET ORAL at 21:15

## 2023-10-15 RX ADMIN — LORAZEPAM 1 MG: 1 TABLET ORAL at 08:30

## 2023-10-15 RX ADMIN — Medication 2 PUFF: at 19:35

## 2023-10-15 RX ADMIN — LORAZEPAM 1 MG: 1 TABLET ORAL at 21:15

## 2023-10-15 RX ADMIN — Medication 2 PUFF: at 08:09

## 2023-10-15 RX ADMIN — HALOPERIDOL 5 MG: 5 TABLET ORAL at 21:15

## 2023-10-15 NOTE — PLAN OF CARE
Problem: Psychosis  Goal: Will report no hallucinations or delusions  Description: INTERVENTIONS:  1. Administer medication as  ordered  2. Assist with reality testing to support increasing orientation  3. Assess if patient's hallucinations or delusions are encouraging self harm or harm to others and intervene as appropriate  Outcome: Progressing     Problem: Anxiety  Goal: Will report anxiety at manageable levels  Description: INTERVENTIONS:  1. Administer medication as ordered  2. Teach and rehearse alternative coping skills  3. Provide emotional support with 1:1 interaction with staff  Outcome: Progressing     Problem: Involuntary Admit  Goal: Will cooperate with staff recommendations and doctor's orders and will demonstrate appropriate behavior  Description: INTERVENTIONS:  1. Treat underlying conditions and offer medication as ordered  2. Educate regarding involuntary admission procedures and rules  3.  Contain excessive/inappropriate behavior per unit and hospital policies  Outcome: Progressing     Problem: Pain  Goal: Verbalizes/displays adequate comfort level or baseline comfort level  Outcome: Progressing

## 2023-10-15 NOTE — PLAN OF CARE
Patient alert and oriented x 2 disoriented to date. Patient rates Depression 10/10 and Anxiety 10/10. Patient denies SI/HI/V/H. Patient states, \"The voices are telling me I am bad. \" Patient crying d/t missing her son. Patient took HS medications. No angry outbursts  noted. No c/o's voiced @ present. Problem: Psychosis  Goal: Will report no hallucinations or delusions  Description: INTERVENTIONS:  1. Administer medication as  ordered  2. Assist with reality testing to support increasing orientation  3. Assess if patient's hallucinations or delusions are encouraging self harm or harm to others and intervene as appropriate  10/14/2023 1056 by Noe Walsh RN  Outcome: Not Progressing     Problem: Anxiety  Goal: Will report anxiety at manageable levels  Description: INTERVENTIONS:  1. Administer medication as ordered  2. Teach and rehearse alternative coping skills  3.  Provide emotional support with 1:1 interaction with staff  10/14/2023 1056 by Noe Walsh RN  Outcome: Not Progressing  Flowsheets (Taken 10/13/2023 2149 by Sal Villareal LPN)  Will report anxiety at manageable levels:   Administer medication as ordered   Teach and rehearse alternative coping skills   Provide emotional support with 1:1 interaction with staff

## 2023-10-16 LAB
BACTERIA SPEC RESP CULT: NORMAL
GRAM STN SPEC: NORMAL

## 2023-10-16 PROCEDURE — 99233 SBSQ HOSP IP/OBS HIGH 50: CPT | Performed by: PSYCHIATRY & NEUROLOGY

## 2023-10-16 PROCEDURE — 6370000000 HC RX 637 (ALT 250 FOR IP): Performed by: PSYCHIATRY & NEUROLOGY

## 2023-10-16 PROCEDURE — 94640 AIRWAY INHALATION TREATMENT: CPT

## 2023-10-16 PROCEDURE — 1240000000 HC EMOTIONAL WELLNESS R&B

## 2023-10-16 PROCEDURE — 94761 N-INVAS EAR/PLS OXIMETRY MLT: CPT

## 2023-10-16 RX ADMIN — HALOPERIDOL 5 MG: 5 TABLET ORAL at 20:29

## 2023-10-16 RX ADMIN — LORAZEPAM 1 MG: 1 TABLET ORAL at 20:29

## 2023-10-16 RX ADMIN — Medication 2 PUFF: at 08:15

## 2023-10-16 RX ADMIN — TRAZODONE HYDROCHLORIDE 50 MG: 50 TABLET ORAL at 20:29

## 2023-10-16 RX ADMIN — HALOPERIDOL 5 MG: 5 TABLET ORAL at 13:05

## 2023-10-16 RX ADMIN — LORAZEPAM 1 MG: 1 TABLET ORAL at 13:04

## 2023-10-16 RX ADMIN — Medication 2 PUFF: at 19:34

## 2023-10-16 NOTE — GROUP NOTE
Group Therapy Note    Date: 10/16/2023    Group Start Time: 1000  Group End Time: 5249  Group Topic: Psychoeducation    [facility]    ESDRAS Yen        Group Therapy Note  Clinician introduced the group member to the 6 area of self-care. They were able to discuss each of the 6 areas, and the things listed under each sub-set of categories. Patients filled the white board with the things they already knew, then organized them under the 6 areas after the discussion and activity, each patient took the self-assessment. Attendees: 10       Patient's Goal:      Notes:  Patient was cooperative and engaged in the group discussion and activity. Patient completed the self-assessment and has it completed for guidance on needed areas of improvement for their self care needs. Status After Intervention:  Improved    Participation Level:  Active Listener and Interactive    Participation Quality: Appropriate, Attentive, Sharing, and Supportive      Speech:  normal      Thought Process/Content: Linear      Affective Functioning: Congruent      Mood: anxious      Level of consciousness:  Attentive      Response to Learning: Able to retain information      Endings: None Reported    Modes of Intervention: Education, Support, Exploration, and Activity      Discipline Responsible: /Counselor      Signature:  ESDRAS Yen

## 2023-10-16 NOTE — PLAN OF CARE
Patient alert and oriented x 3. Patient rates Depression 5/10 and Anxiety 5/10. Patient watching T.V. in the dayroom @ the beginning of the shift. Patient took have HS medications. No angry outbursts noted. No c/o's voiced @ present.

## 2023-10-16 NOTE — BH NOTE
Attempted to contact patients father Sylvia Setting Covert. No answer, message left for return call with patients permission.

## 2023-10-16 NOTE — BH NOTE
Rhoda Lorenzo is clearer and is now logical, coherent, and cooperative during assessment. She has been visible much of the day on the unit, she is observed talking to peers, coloring, attending groups, and occasionally laughing. States she decompensated due to the inability to afford her medication without insurance. Discussed that she seems to have [place] Medicaid, will confirm this for patient. She declines hearing voices but does state that she periodically continues to have visual hallucination, but seldomly. States overall her mood is improved although she does continue to have some mood instability. Became tearful when discussing her son and her inability to care for him while inpatient.

## 2023-10-16 NOTE — PLAN OF CARE
Problem: Involuntary Admit  Goal: Will cooperate with staff recommendations and doctor's orders and will demonstrate appropriate behavior  Description: INTERVENTIONS:  1. Treat underlying conditions and offer medication as ordered  2. Educate regarding involuntary admission procedures and rules  3. Contain excessive/inappropriate behavior per unit and hospital policies  Outcome: Completed     Patient is a Voluntary admission. Compliant with care.

## 2023-10-17 VITALS
DIASTOLIC BLOOD PRESSURE: 80 MMHG | RESPIRATION RATE: 16 BRPM | BODY MASS INDEX: 27.08 KG/M2 | HEIGHT: 58 IN | TEMPERATURE: 97.3 F | WEIGHT: 129 LBS | HEART RATE: 83 BPM | OXYGEN SATURATION: 99 % | SYSTOLIC BLOOD PRESSURE: 110 MMHG

## 2023-10-17 PROCEDURE — 6370000000 HC RX 637 (ALT 250 FOR IP): Performed by: PSYCHIATRY & NEUROLOGY

## 2023-10-17 PROCEDURE — 99239 HOSP IP/OBS DSCHRG MGMT >30: CPT | Performed by: PSYCHIATRY & NEUROLOGY

## 2023-10-17 PROCEDURE — 5130000000 HC BRIDGE APPOINTMENT

## 2023-10-17 PROCEDURE — 94640 AIRWAY INHALATION TREATMENT: CPT

## 2023-10-17 PROCEDURE — 94761 N-INVAS EAR/PLS OXIMETRY MLT: CPT

## 2023-10-17 RX ORDER — ALBUTEROL SULFATE 90 UG/1
2 AEROSOL, METERED RESPIRATORY (INHALATION)
Qty: 18 G | Refills: 0 | Status: SHIPPED | OUTPATIENT
Start: 2023-10-17

## 2023-10-17 RX ORDER — HALOPERIDOL 5 MG/1
5 TABLET ORAL 2 TIMES DAILY
Qty: 60 TABLET | Refills: 0 | Status: SHIPPED | OUTPATIENT
Start: 2023-10-17

## 2023-10-17 RX ADMIN — LORAZEPAM 1 MG: 1 TABLET ORAL at 09:10

## 2023-10-17 RX ADMIN — Medication 2 PUFF: at 08:29

## 2023-10-17 RX ADMIN — HALOPERIDOL 5 MG: 5 TABLET ORAL at 09:10

## 2023-10-17 NOTE — DISCHARGE SUMMARY
FENTANYL SCREEN, URINE 10/07/2023 Neg  Negative <5 ng/mL Final    pH, UA 10/07/2023 6.0   Final    Comment: Urine pH less than 5.0 or greater than 8.0 may indicate sample adulteration. Another sample should be collected if clinically  indicated. Drug Screen Comment: 10/07/2023 see below   Final    Comment: This method is a screening test to detect only these drug  classes as part of a medical workup. Confirmatory testing  by another method should be ordered if clinically indicated.       Ethanol Lvl 10/07/2023 26  mg/dL Final    Comment:    None Detected  Conversion factor:  100 mg/dl = .100 g/dl  For Medical Purposes Only      Potassium 10/08/2023 3.5  3.5 - 5.1 mmol/L Final    CULTURE, RESPIRATORY 10/14/2023 Normal respiratory yamilet   Final    Gram Stain Result 10/14/2023    Final                    Value:2+ WBC's  1+ Epithelial Cells  1+ Gram negative rods  2+ Gram positive cocci      WBC 10/14/2023 5.1  4.0 - 11.0 K/uL Final    RBC 10/14/2023 4.25  4.00 - 5.20 M/uL Final    Hemoglobin 10/14/2023 13.1  12.0 - 16.0 g/dL Final    Hematocrit 10/14/2023 38.6  36.0 - 48.0 % Final    MCV 10/14/2023 91.0  80.0 - 100.0 fL Final    MCH 10/14/2023 30.7  26.0 - 34.0 pg Final    MCHC 10/14/2023 33.8  31.0 - 36.0 g/dL Final    RDW 10/14/2023 13.1  12.4 - 15.4 % Final    Platelets 35/23/7627 273  135 - 450 K/uL Final    MPV 10/14/2023 8.2  5.0 - 10.5 fL Final    Neutrophils % 10/14/2023 60.1  % Final    Lymphocytes % 10/14/2023 30.5  % Final    Monocytes % 10/14/2023 7.3  % Final    Eosinophils % 10/14/2023 1.2  % Final    Basophils % 10/14/2023 0.9  % Final    Neutrophils Absolute 10/14/2023 3.1  1.7 - 7.7 K/uL Final    Lymphocytes Absolute 10/14/2023 1.6  1.0 - 5.1 K/uL Final    Monocytes Absolute 10/14/2023 0.4  0.0 - 1.3 K/uL Final    Eosinophils Absolute 10/14/2023 0.1  0.0 - 0.6 K/uL Final    Basophils Absolute 10/14/2023 0.0  0.0 - 0.2 K/uL Final    Sodium 10/14/2023 138  136 - 145 mmol/L Final    Potassium

## 2023-10-17 NOTE — BH NOTE
Juan Servin will be discharged per Dr. Yoel Sky order. She has significant improvement in her psychiatric symptoms. She is brightened, visible on the unit, social with peers. Eating and sleeping well. States her mood is \"pretty good. \" She states she is going to go to [address] [place] [address] at discharge, who she states is just a friend. Discussed medication compliance with patient. Discussed safety plan with patient. Declines questions or concerns at this time.

## 2023-10-17 NOTE — BH NOTE
[address] [facility]  Discharge Note    Pt discharged with followings belongings:   Dental Appliances: None  Vision - Corrective Lenses: None  Hearing Aid: None  Jewelry: None  Body Piercings Removed: No  Clothing: Footwear, Jacket/Coat, Pants, Shirt, Undergarments  Other Valuables: Other (Comment) (none)   Valuables sent home with patient or returned to patient. Patient educated on aftercare instructions: yes. Information faxed to [facility] by this writer  at 1:52 PM .Patient verbalize understanding of AVS:  yes. Status EXAM upon discharge:  Mental Status and Behavioral Exam  Normal: Yes  Level of Assistance: Independent/Self  Facial Expression: Brightened (at times presents blunted)  Affect: Blunt  Level of Consciousness: Alert  Frequency of Checks: 4 times per hour, close  Mood:Normal: Yes (\"pretty good\", anxious at baseline)  Mood: Anxious  Motor Activity:Normal: Yes  Motor Activity: Decreased  Eye Contact: Fair  Observed Behavior: Cooperative, Friendly  Sexual Misconduct History: Current - no  Preception: [place] to person, [place] to time, [place] to place, [place] to situation  Attention:Normal: Yes  Attention: Distractible  Thought Processes: Unremarkable  Thought Content:Normal: Yes  Thought Content: Paranoia, Preoccupations  Depression Symptoms: No problems reported or observed. Anxiety Symptoms: Generalized  [place] Symptoms: No problems reported or observed. Hallucinations:  Auditory (comment)  Delusions: No (none voiced)  Delusions: Paranoid  Memory:Normal: No  Memory: Poor recent, Poor remote  Insight and Judgment: No (fairly improved while on medication)  Insight and Judgment: Poor judgment, Poor insight    Tobacco Screening:  Practical Counseling, on admission, kareem X, if applicable and completed (first 3 are required if patient doesn't refuse):            (X) Recognizing danger situations (included triggers and roadblocks)                    (X) Coping skills (new ways to manage stress,relaxation

## 2023-10-17 NOTE — PLAN OF CARE
Patient alert and oriented x 2 disoriented to date. Patient visible and social with peers. Patient denies SI/HI/A/V/H. Patient took HS medications. Non angry outbursts noted. No c/o's voiced @ present.

## 2023-10-18 ENCOUNTER — FOLLOWUP TELEPHONE ENCOUNTER (OUTPATIENT)
Dept: PSYCHIATRY | Age: 33
End: 2023-10-18

## 2023-10-19 ENCOUNTER — FOLLOWUP TELEPHONE ENCOUNTER (OUTPATIENT)
Dept: PSYCHIATRY | Age: 33
End: 2023-10-19

## 2023-10-20 ENCOUNTER — FOLLOWUP TELEPHONE ENCOUNTER (OUTPATIENT)
Dept: PSYCHIATRY | Age: 33
End: 2023-10-20

## 2023-12-15 ENCOUNTER — HOSPITAL ENCOUNTER (INPATIENT)
Age: 33
LOS: 6 days | Discharge: HOME | DRG: 750 | End: 2023-12-21
Attending: PSYCHIATRY & NEUROLOGY | Admitting: PSYCHIATRY & NEUROLOGY
Payer: MEDICAID

## 2023-12-15 DIAGNOSIS — T50.905A ADVERSE EFFECT OF DRUG, INITIAL ENCOUNTER: ICD-10-CM

## 2023-12-15 DIAGNOSIS — F12.90 MARIJUANA USE: ICD-10-CM

## 2023-12-15 DIAGNOSIS — G25.2 ACTION TREMOR: Primary | ICD-10-CM

## 2023-12-15 PROBLEM — F20.9 SCHIZOPHRENIA (HCC): Status: ACTIVE | Noted: 2023-12-15

## 2023-12-15 LAB
ALBUMIN SERPL-MCNC: 4.7 G/DL (ref 3.4–5)
ALBUMIN/GLOB SERPL: 1.7 {RATIO} (ref 1.1–2.2)
ALP SERPL-CCNC: 63 U/L (ref 40–129)
ALT SERPL-CCNC: 16 U/L (ref 10–40)
AMPHETAMINES UR QL SCN>1000 NG/ML: ABNORMAL
ANION GAP SERPL CALCULATED.3IONS-SCNC: 11 MMOL/L (ref 3–16)
APAP SERPL-MCNC: <5 UG/ML (ref 10–30)
AST SERPL-CCNC: 32 U/L (ref 15–37)
BARBITURATES UR QL SCN>200 NG/ML: ABNORMAL
BASOPHILS # BLD: 0.1 K/UL (ref 0–0.2)
BASOPHILS NFR BLD: 0.9 %
BENZODIAZ UR QL SCN>200 NG/ML: ABNORMAL
BILIRUB SERPL-MCNC: <0.2 MG/DL (ref 0–1)
BILIRUB UR QL STRIP.AUTO: NEGATIVE
BUN SERPL-MCNC: 11 MG/DL (ref 7–20)
CALCIUM SERPL-MCNC: 9.1 MG/DL (ref 8.3–10.6)
CANNABINOIDS UR QL SCN>50 NG/ML: POSITIVE
CHLORIDE SERPL-SCNC: 99 MMOL/L (ref 99–110)
CLARITY UR: CLEAR
CO2 SERPL-SCNC: 28 MMOL/L (ref 21–32)
COCAINE UR QL SCN: ABNORMAL
COLOR UR: YELLOW
CREAT SERPL-MCNC: 0.7 MG/DL (ref 0.6–1.1)
DEPRECATED RDW RBC AUTO: 12.9 % (ref 12.4–15.4)
DRUG SCREEN COMMENT UR-IMP: ABNORMAL
EOSINOPHIL # BLD: 0 K/UL (ref 0–0.6)
EOSINOPHIL NFR BLD: 0.5 %
EPI CELLS #/AREA URNS HPF: ABNORMAL /HPF (ref 0–5)
ETHANOLAMINE SERPL-MCNC: NORMAL MG/DL (ref 0–0.08)
FENTANYL SCREEN, URINE: ABNORMAL
GFR SERPLBLD CREATININE-BSD FMLA CKD-EPI: >60 ML/MIN/{1.73_M2}
GLUCOSE SERPL-MCNC: 98 MG/DL (ref 70–99)
GLUCOSE UR STRIP.AUTO-MCNC: NEGATIVE MG/DL
HCG SERPL QL: NEGATIVE
HCT VFR BLD AUTO: 42.7 % (ref 36–48)
HGB BLD-MCNC: 14.6 G/DL (ref 12–16)
HGB UR QL STRIP.AUTO: NEGATIVE
KETONES UR STRIP.AUTO-MCNC: NEGATIVE MG/DL
LACTATE BLDV-SCNC: 0.7 MMOL/L (ref 0.4–1.9)
LACTATE BLDV-SCNC: 1.4 MMOL/L (ref 0.4–1.9)
LEUKOCYTE ESTERASE UR QL STRIP.AUTO: ABNORMAL
LYMPHOCYTES # BLD: 1.3 K/UL (ref 1–5.1)
LYMPHOCYTES NFR BLD: 15.5 %
MCH RBC QN AUTO: 30.8 PG (ref 26–34)
MCHC RBC AUTO-ENTMCNC: 34.2 G/DL (ref 31–36)
MCV RBC AUTO: 89.9 FL (ref 80–100)
METHADONE UR QL SCN>300 NG/ML: ABNORMAL
MONOCYTES # BLD: 0.6 K/UL (ref 0–1.3)
MONOCYTES NFR BLD: 7.2 %
MUCOUS THREADS #/AREA URNS LPF: ABNORMAL /LPF
NEUTROPHILS # BLD: 6.5 K/UL (ref 1.7–7.7)
NEUTROPHILS NFR BLD: 75.9 %
NITRITE UR QL STRIP.AUTO: NEGATIVE
OPIATES UR QL SCN>300 NG/ML: ABNORMAL
OXYCODONE UR QL SCN: ABNORMAL
PCP UR QL SCN>25 NG/ML: ABNORMAL
PH UR STRIP.AUTO: 6 [PH] (ref 5–8)
PH UR STRIP: 6 [PH]
PLATELET # BLD AUTO: 343 K/UL (ref 135–450)
PMV BLD AUTO: 7.8 FL (ref 5–10.5)
POTASSIUM SERPL-SCNC: 4.9 MMOL/L (ref 3.5–5.1)
PROCALCITONIN SERPL IA-MCNC: 0.02 NG/ML (ref 0–0.15)
PROT SERPL-MCNC: 7.5 G/DL (ref 6.4–8.2)
PROT UR STRIP.AUTO-MCNC: NEGATIVE MG/DL
RBC # BLD AUTO: 4.76 M/UL (ref 4–5.2)
RBC #/AREA URNS HPF: ABNORMAL /HPF (ref 0–4)
RENAL EPI CELLS #/AREA UR COMP ASSIST: ABNORMAL /HPF (ref 0–1)
SALICYLATES SERPL-MCNC: 1.6 MG/DL (ref 15–30)
SODIUM SERPL-SCNC: 138 MMOL/L (ref 136–145)
SP GR UR STRIP.AUTO: 1.02 (ref 1–1.03)
UA COMPLETE W REFLEX CULTURE PNL UR: ABNORMAL
UA DIPSTICK W REFLEX MICRO PNL UR: YES
URN SPEC COLLECT METH UR: ABNORMAL
UROBILINOGEN UR STRIP-ACNC: 0.2 E.U./DL
WBC # BLD AUTO: 8.6 K/UL (ref 4–11)
WBC #/AREA URNS HPF: ABNORMAL /HPF (ref 0–5)

## 2023-12-15 PROCEDURE — 87086 URINE CULTURE/COLONY COUNT: CPT

## 2023-12-15 PROCEDURE — 80179 DRUG ASSAY SALICYLATE: CPT

## 2023-12-15 PROCEDURE — 83605 ASSAY OF LACTIC ACID: CPT

## 2023-12-15 PROCEDURE — 84145 PROCALCITONIN (PCT): CPT

## 2023-12-15 PROCEDURE — 6370000000 HC RX 637 (ALT 250 FOR IP): Performed by: NURSE PRACTITIONER

## 2023-12-15 PROCEDURE — 84703 CHORIONIC GONADOTROPIN ASSAY: CPT

## 2023-12-15 PROCEDURE — 82077 ASSAY SPEC XCP UR&BREATH IA: CPT

## 2023-12-15 PROCEDURE — 80307 DRUG TEST PRSMV CHEM ANLYZR: CPT

## 2023-12-15 PROCEDURE — 81001 URINALYSIS AUTO W/SCOPE: CPT

## 2023-12-15 PROCEDURE — 99285 EMERGENCY DEPT VISIT HI MDM: CPT

## 2023-12-15 PROCEDURE — 85025 COMPLETE CBC W/AUTO DIFF WBC: CPT

## 2023-12-15 PROCEDURE — 80053 COMPREHEN METABOLIC PANEL: CPT

## 2023-12-15 PROCEDURE — 1240000000 HC EMOTIONAL WELLNESS R&B

## 2023-12-15 PROCEDURE — 6370000000 HC RX 637 (ALT 250 FOR IP)

## 2023-12-15 PROCEDURE — 36415 COLL VENOUS BLD VENIPUNCTURE: CPT

## 2023-12-15 PROCEDURE — 80143 DRUG ASSAY ACETAMINOPHEN: CPT

## 2023-12-15 RX ORDER — IBUPROFEN 400 MG/1
400 TABLET ORAL EVERY 6 HOURS PRN
Status: DISCONTINUED | OUTPATIENT
Start: 2023-12-15 | End: 2023-12-21 | Stop reason: HOSPADM

## 2023-12-15 RX ORDER — OLANZAPINE 5 MG/1
5 TABLET ORAL EVERY 4 HOURS PRN
Status: DISCONTINUED | OUTPATIENT
Start: 2023-12-15 | End: 2023-12-21 | Stop reason: HOSPADM

## 2023-12-15 RX ORDER — DIPHENHYDRAMINE HCL 25 MG
25 TABLET ORAL ONCE
Status: COMPLETED | OUTPATIENT
Start: 2023-12-15 | End: 2023-12-15

## 2023-12-15 RX ORDER — TRAZODONE HYDROCHLORIDE 50 MG/1
50 TABLET ORAL NIGHTLY PRN
Status: DISCONTINUED | OUTPATIENT
Start: 2023-12-15 | End: 2023-12-21 | Stop reason: HOSPADM

## 2023-12-15 RX ORDER — DIPHENHYDRAMINE HYDROCHLORIDE 50 MG/ML
50 INJECTION INTRAMUSCULAR; INTRAVENOUS EVERY 4 HOURS PRN
Status: DISCONTINUED | OUTPATIENT
Start: 2023-12-15 | End: 2023-12-21 | Stop reason: HOSPADM

## 2023-12-15 RX ORDER — MAGNESIUM HYDROXIDE/ALUMINUM HYDROXICE/SIMETHICONE 120; 1200; 1200 MG/30ML; MG/30ML; MG/30ML
30 SUSPENSION ORAL EVERY 6 HOURS PRN
Status: DISCONTINUED | OUTPATIENT
Start: 2023-12-15 | End: 2023-12-21 | Stop reason: HOSPADM

## 2023-12-15 RX ORDER — HYDROXYZINE 50 MG/1
50 TABLET, FILM COATED ORAL 3 TIMES DAILY PRN
Status: DISCONTINUED | OUTPATIENT
Start: 2023-12-15 | End: 2023-12-21 | Stop reason: HOSPADM

## 2023-12-15 RX ORDER — ACETAMINOPHEN 325 MG/1
650 TABLET ORAL EVERY 4 HOURS PRN
Status: DISCONTINUED | OUTPATIENT
Start: 2023-12-15 | End: 2023-12-21 | Stop reason: HOSPADM

## 2023-12-15 RX ORDER — POLYETHYLENE GLYCOL 3350 17 G
2 POWDER IN PACKET (EA) ORAL
Status: DISCONTINUED | OUTPATIENT
Start: 2023-12-15 | End: 2023-12-21 | Stop reason: HOSPADM

## 2023-12-15 RX ADMIN — TRAZODONE HYDROCHLORIDE 50 MG: 50 TABLET ORAL at 20:45

## 2023-12-15 RX ADMIN — OLANZAPINE 5 MG: 5 TABLET, FILM COATED ORAL at 20:45

## 2023-12-15 RX ADMIN — DIPHENHYDRAMINE HCL 25 MG: 25 TABLET ORAL at 14:30

## 2023-12-15 NOTE — ED PROVIDER NOTES
[address]      I am the Primary Clinician of Record    Note started: 1:27 PM EST 12/15/23    VALERIE. I have evaluated this patient. Pt Name: Zion Madrigal  MRN: [id number]  [address] [facility] 1990  Dateof evaluation: 12/15/2023  Provider: INDIO Tafoya - CNP  PCP: Stephanie, [id number] [address]  ED Attending: No att. providers found      [address]       Chief Complaint   Patient presents with    Seizure like activity     Seizures per patient for months, states she gets tense all over and has involuntary movements. Patient aware of movements       HISTORY OF PRESENTILLNESS   (Location/Symptom, Timing/Onset, Context/Setting, Quality, Duration, Modifying Factors, Severity)  Note limiting factors. Zion Madrigal is a 34 y.o. female for concern for seizures. Onset was the past few months. Context includes patient reports's that she is having seizures. She reports that she gets pins however her jaw starts to tremor. Patient denies any loss of bowel or bladder. Patient's dad is at the bedside and states that the patient has had medication adjustments and he suspects that this may be playing a part. He reports that she has made comments that it may be better off if she was not here however she denies any suicidal or homicidal ideations. Alleviating factors include nothing. Aggravating factors include nothing. Pain is 0/10. Nothing has been used for pain today. Nursing Notes were all reviewed and agreed with or any disagreements were addressed  in the HPI. REVIEW OF SYSTEMS       Review of Systems   Constitutional:  Negative for activity change, appetite change and fever. HENT:  Negative for congestion, facial swelling, rhinorrhea and sore throat. Eyes:  Negative for visual disturbance. Respiratory:  Negative for shortness of breath. Cardiovascular:  Negative for chest pain. Gastrointestinal:  Negative for abdominal pain.

## 2023-12-15 NOTE — BH NOTE
Jane Prince is alert and oriented in bed at this time. She is isolative to her room and sleeping off and on. She was able to participate in some of her assessment. Will pass on to next shift to finish admission.

## 2023-12-15 NOTE — ED NOTES
Collateral Contact:  Name:Remy GODINEZ:[phone]  Relation to Patient: dad  Last Contact with Patient: is with pt currently   Concerns: Barnwell Given reports he and pt live together. He reports pt will pace around in circles. He reports she does not talk to anyone or will go anywhere. He reports she is either sleeping all the time or not enough. He reports pt's appetite has decreased. He reports pt has been depressed and reports she feels no one loves her. He reports pt has made statements \" I would be better off dead\". He reports he is worried pt will kill herself. He reports there is something wrong with pt and states she is not herself. He reports this started a couple of years ago and states pt has been going down hill since then. He reports pt gets her son every other week and states he is taking care of him because pt cannot right now. He reports pt needs her medications adjusted. He reports he does not know what to do. He reports he is not moving to [place] until pt is better.     Barnwell Given is supportive of plan to admit [facility]

## 2023-12-15 NOTE — BH NOTE
Pt arrived to unit via wheelchair transported by security and ED staff from [facility] ED. Pt in no acute distress.

## 2023-12-15 NOTE — ED NOTES
Presenting Problem: Patient presents to ED voluntarily after coming in for seizures/shaking and pt states her body is tensing up. Pt reports she cannot control her movements and will shake all over. This writer observed pt's jaw shaking constantly during assessment. Pt reports she will pace back and forth. She reports her body hurts and states her muscles will cramp a lot. She reports she either sleeps too much or not enough. Pt reports she has been depressed and feels no one loves her. Pt denies suicidal ideations, plan, and intent. Pt denies homicidal ideations. Pt denies audio/visual hallucinations. Appearance/Hygiene:  ill-appearing, street clothes, seated in bed, fair grooming, and fair hygiene   Motor Behavior: pt is shaking and moving her legs back and forth. Attitude: cooperative  Affect: flat affect   Speech: normal pitch and normal volume  Mood: anxious   Thought Processes: [place]  Perceptions: Absent   Thought content: Pt reports it is hard to see past the day. Hopelessness. Orientation: A&Ox4   Memory: intact  Concentration: Good    Insight/ judgement: impaired judgment and insight       Psychosocial and contextual factors: Pt reports she lives with her dad. She reports she cannot control her movements and is shaking all over. She reports her muscles are cramping. She reports she is not currently working right now. She reports she will either sleep too little or too much. She reports her appetite has been alright. She reports she smokes half a pack of cigarettes a day. She reports her dad is her support system. She reports she feels her medications need to be adjusted. She reports she cannot sit still and her muscles are locking up on her. Pt reports she feels she needs an in-pt psych admission. Pt reports she has been diagnosed with schizophrenia, anxiety, and depression. C-SSRS flowsheet is  Complete.     Psychiatric History (including current outpatient provider and past inpatient

## 2023-12-15 NOTE — ED NOTES
Level of Care Disposition: admit     Patient was seen by ED provider and Nursing/SW staff. The case presented to psychiatric provider on-call CARLOTA Nunez. Based on the ED evaluation and information presented to the provider by this writer it is the recommendation of the on call psychiatric provider that inpatient hospitalization is the least restrictive environment for the patient at this time. The patient will be admitted to the inpatient unit.            Insurance Pre certification Authorization: [facility] Medicaid Otsa LSW

## 2023-12-16 PROBLEM — T50.905A DRUG REACTION: Status: ACTIVE | Noted: 2023-12-16

## 2023-12-16 PROBLEM — G25.2 ACTION TREMOR: Status: ACTIVE | Noted: 2023-12-16

## 2023-12-16 PROCEDURE — 6370000000 HC RX 637 (ALT 250 FOR IP)

## 2023-12-16 PROCEDURE — 6360000002 HC RX W HCPCS

## 2023-12-16 PROCEDURE — 99223 1ST HOSP IP/OBS HIGH 75: CPT

## 2023-12-16 PROCEDURE — 1240000000 HC EMOTIONAL WELLNESS R&B

## 2023-12-16 PROCEDURE — 99221 1ST HOSP IP/OBS SF/LOW 40: CPT

## 2023-12-16 RX ORDER — HALOPERIDOL 5 MG/1
5 TABLET ORAL 2 TIMES DAILY
Status: DISCONTINUED | OUTPATIENT
Start: 2023-12-16 | End: 2023-12-16

## 2023-12-16 RX ORDER — BENZTROPINE MESYLATE 1 MG/1
1 TABLET ORAL 2 TIMES DAILY
Status: DISCONTINUED | OUTPATIENT
Start: 2023-12-16 | End: 2023-12-19

## 2023-12-16 RX ORDER — HALOPERIDOL 1 MG/1
2 TABLET ORAL 2 TIMES DAILY
Status: DISCONTINUED | OUTPATIENT
Start: 2023-12-16 | End: 2023-12-19

## 2023-12-16 RX ADMIN — BENZTROPINE MESYLATE 1 MG: 1 TABLET ORAL at 11:03

## 2023-12-16 RX ADMIN — HALOPERIDOL 5 MG: 5 TABLET ORAL at 10:02

## 2023-12-16 RX ADMIN — PALIPERIDONE PALMITATE 117 MG: 117 INJECTION INTRAMUSCULAR at 11:03

## 2023-12-16 RX ADMIN — BENZTROPINE MESYLATE 1 MG: 1 TABLET ORAL at 21:54

## 2023-12-16 RX ADMIN — HALOPERIDOL 2 MG: 1 TABLET ORAL at 21:54

## 2023-12-16 NOTE — BH NOTE
Informed of newly ordered medication and reported she understood what they are used for. Received first dose of [place]  mg IM in lt deltoid.

## 2023-12-16 NOTE — BH NOTE
Pleasant and smile during interaction. Noted to be trimerous. Declined morning meal at this time. Withdrawn to assigned room.

## 2023-12-16 NOTE — H&P
INITIAL PSYCHIATRIC HISTORY AND PHYSICAL      Patient name: Marcelle Herndon  Admit date: 12/15/2023  Today's date: 12/16/2023           CC:  Schizophrenia    HPI:   Patient seen in room on Adult Behavioral Unit. Patient is a 34 y.o. female who presents   to ED voluntarily after coming in for seizures/shaking and pt states her body is tensing up. Per  notes: \"Pt reports she cannot control her movements and will shake all over. This writer observed pt's jaw shaking constantly during assessment. Pt reports she will pace back and forth. She reports her body hurts and states her muscles will cramp a lot. She reports she either sleeps too much or not enough. Pt reports she has been depressed and feels no one loves her. Pt denies suicidal ideations, plan, and intent. Pt denies homicidal ideations. Pt denies audio/visual hallucinations. \"      Per collateral from father: \"He reports pt will pace around in circles. He reports she does not talk to anyone or will go anywhere. He reports she is either sleeping all the time or not enough. He reports pt's appetite has decreased. He reports pt has been depressed and reports she feels no one loves her. He reports pt has made statements \" I would be better off dead\". He reports he is worried pt will kill herself. He reports there is something wrong with pt and states she is not herself. He reports this started a couple of years ago and states pt has been going down hill since then. He reports pt gets her son every other week and states he is taking care of him because pt cannot right now. \"      Pt now on I, reports she is not feeling good, has not felt good since leaving the unit in Oct.  Pt reports feeling very shaking, thought she was having seizures. Pt is noted to have tremor to face, arms, legs, states she feels like she needs to walk and move all the time.   Pt has been taking her Haldol BID, has not had another Invega injection since leaving [facility] in Oct, stating

## 2023-12-16 NOTE — CARE COORDINATION
12/16/23 1133   Psychiatric History   Psychiatric history treatment Psychiatric admissions  ([address] in [place], [place])   Contact information [place]   Are there any medication issues? No   Recent Psychological Experiences Recent negative physical changes  (states that she thought she was having a seizure)   Support System   Support system Primary support persons   Types of Support System Aunt; Father   Problems in support system None   Current Living Situation   Home Living Adequate   Living information Lives with others  (with father and patient's 8 y.o son)   Problems with living situation  No   Lack of basic needs No   SSDI/SSI none   Other government assistance Medicaid   Problems with environment patient denies   Current abuse issues patient denies   Supervised setting Family supervision   Relationship problems No   Contact information Ayesha Manuel [phone]   Medical and Self-Care Issues   Relevant medical problems none   Relevant self-care issues patient denies   Barriers to treatment Yes  (insurance out of the [facility] where she was seeking treatment)   Family [place]   Spouse/partner-name/age none   Children-names/ages Carlos-son, age 8   Parents Ayesha Manuel, mother passed away from heart attack at age 64   Siblings 3 siblings   Contact information Ayesha Manuel [phone]   Childhood   Raised by Biological mother   Biological mother passed away at age 64 from heart attack   Relevant family history father has ADHD   History of abuse No   Legal History   Legal history No   Juvenile legal history No   Abuse Assessment   Physical Abuse Denies   Verbal Abuse Denies   Emotional abuse Denies   Financial Abuse Denies   Sexual abuse Denies   Possible abuse reported to None needed   Substance Use   Use of substances  Yes   Substance 1   Substance used Marijuana   Amount/frequency/route 1 joint per day   Age of first use 17   Last use/History a few days ago   Motivation for SA Treatment   Stage of

## 2023-12-16 NOTE — PLAN OF CARE
Problem: Anxiety  Goal: Will report anxiety at manageable levels  Description: INTERVENTIONS:  1. Administer medication as ordered  2. Teach and rehearse alternative coping skills  3. Provide emotional support with 1:1 interaction with staff  12/16/2023 1216 by Ron Hansen LPN  Outcome: Progressing     Problem: Coping  Goal: Pt/Family able to verbalize concerns and demonstrate effective coping strategies  Description: INTERVENTIONS:  1. Assist patient/family to identify coping skills, available support systems and cultural and spiritual values  2. Provide emotional support, including active listening and acknowledgement of concerns of patient and caregivers  3. Reduce environmental stimuli, as able  4. Instruct patient/family in relaxation techniques, as appropriate  5. Assess for spiritual pain/suffering and initiate Spiritual Care, Psychosocial Clinical Specialist consults as needed  12/16/2023 1216 by Ron Hansen LPN  Outcome: Progressing     Problem: Depression/Self Harm  Goal: Effect of psychiatric condition will be minimized and patient will be protected from self harm  Description: INTERVENTIONS:  1. Assess impact of patient's symptoms on level of functioning, self care needs and offer support as indicated  2. Assess patient/family knowledge of depression, impact on illness and need for teaching  3. Provide emotional support, presence and reassurance  4. Assess for possible suicidal thoughts or ideation. If patient expresses suicidal thoughts or statements do not leave alone, initiate Suicide Precautions, move to a room close to the nursing station and obtain sitter  5.  Initiate consults as appropriate with Mental Health Professional, Spiritual Care, Psychosocial CNS, and consider a recommendation to the LIP for a Psychiatric Consultation  12/16/2023 1216 by Ron Hansen LPN  Outcome: Progressing     Problem: Sleep Disturbance  Goal: Will exhibit normal sleeping pattern  Description: INTERVENTIONS:  1. Administer medication as ordered  2. Decrease environmental stimuli, including noise, as appropriate  3. Discourage social isolation and naps during the day  12/16/2023 1216 by Juan Manuel Mcgovern LPN  Outcome: Progressing     Problem: Coping  Goal: Pt/Family able to verbalize concerns and demonstrate effective coping strategies  Description: INTERVENTIONS:  1. Assist patient/family to identify coping skills, available support systems and cultural and spiritual values  2. Provide emotional support, including active listening and acknowledgement of concerns of patient and caregivers  3. Reduce environmental stimuli, as able  4. Instruct patient/family in relaxation techniques, as appropriate  5.  Assess for spiritual pain/suffering and initiate Spiritual Care, Psychosocial Clinical Specialist consults as needed  12/16/2023 1216 by Juan Manuel Mcgovern LPN  Outcome: Progressing  12/15/2023 2227 by Urvashi Overton RN  Outcome: Not Progressing  Flowsheets (Taken 12/15/2023 2001)  Patient/family able to verbalize anxieties, fears, and concerns, and demonstrate effective coping:   Assist patient/family to identify coping skills, available support systems and cultural and spiritual values   Provide emotional support, including active listening and acknowledgement of concerns of patient and caregivers   Reduce environmental stimuli, as able   Instruct patient/family in relaxation techniques, as appropriate

## 2023-12-16 NOTE — PROGRESS NOTES
Behavioral Services  Medicare Certification Upon Admission    I certify that this patient's inpatient psychiatric hospital admission is medically necessary for:    [x] (1) Treatment which could reasonably be expected to improve this patient's condition,       [x] (2) Or for diagnostic study;     AND     [x](2) The inpatient psychiatric services are provided while the individual is under the care of a physician and are included in the individualized plan of care.     Estimated length of stay/service 2-5 days    Plan for post-hospital care outpatient services    Electronically signed by INDIO Rodriguez CNP on 12/16/2023 at 9:28 AM

## 2023-12-16 NOTE — H&P
Hospital Medicine History & Physical      PCP: [facility] [place] [facility]    Date of Admission: 12/15/2023    Date of Service: Pt seen/examined on 12/16/2023     Chief Complaint:    Chief Complaint   Patient presents with    Seizure like activity     Seizures per patient for months, states she gets tense all over and has involuntary movements. Patient aware of movements         History Of Present Illness: The patient is a 34 y.o. female with pmhx of anxiety, depression, asthma who presented to [facility] for concern for tremors from new medications. Patient was seen and evaluated in the ED by the ED medical provider, patient was medically cleared for admission to [facility]. This note serves as an admission medical H&P. Tobacco use: 0.5 ppd   ETOH use: None   Illicit drug use: None     Patient denies any medical complaints     Past Medical History:        Diagnosis Date    Anxiety     Asthma     Depression        Past Surgical History:        Procedure Laterality Date    BREAST SURGERY      EYE SURGERY         Medications Prior to Admission:    Prior to Admission medications    Medication Sig Start Date End Date Taking? Authorizing Provider   albuterol sulfate HFA (VENTOLIN HFA) 108 (90 Base) MCG/ACT inhaler Inhale 2 puffs into the lungs in the morning and 2 puffs in the evening. 10/17/23   Komal Li MD   haloperidol (HALDOL) 5 MG tablet Take 1 tablet by mouth 2 times daily 10/17/23   Komal Li MD   paliperidone palmitate ER (INVEGA SUSTENNA) 117 MG/0.75ML KAREN IM injection Inject 117 mg into the muscle once for 1 dose 11/10/23 11/10/23  Marco Liang MD       Allergies:  Morphine    Social History:   TOBACCO:   reports that she has been smoking cigarettes. She has a 10.0 pack-year smoking history. She has never used smokeless tobacco.  ETOH:   reports that she does not currently use alcohol. Family History:   Positive as follows:    History reviewed.  No pertinent family ALT 16 12/15/2023    LABGLOM >60 12/15/2023    GFRAA >60 04/16/2020    AGRATIO 1.7 12/15/2023    GLOB 2.9 04/16/2020           U/A:    Lab Results   Component Value Date/Time    NITRITE NEG 04/14/2011 02:48 PM    COLORU Yellow 12/15/2023 01:34 PM    WBCUA 3-5 12/15/2023 01:34 PM    RBCUA 3-4 12/15/2023 01:34 PM    MUCUS 2+ 12/15/2023 01:34 PM    BACTERIA Rare 03/19/2023 03:02 PM    CLARITYU Clear 12/15/2023 01:34 PM    SPECGRAV 1.025 12/15/2023 01:34 PM    LEUKOCYTESUR TRACE 12/15/2023 01:34 PM    BLOODU Negative 12/15/2023 01:34 PM    GLUCOSEU Negative 12/15/2023 01:34 PM       CULTURES  Results       ** No results found for the last 336 hours. **              EKG:  Not obtained     RADIOLOGY  No orders to display       ASSESSMENT/PLAN:  #Anxiety   #Depression   - per psychiatry team    #Essential tremor   -concerned BP cannot handle propranolol   -cogentin started per psychiatry     #Pyuria   -urine culture pending   -asymptomatic     #Asthma without AE   -albuterol prn     #Cannabis use      Pt has no medical complaints at this time. They were informed that should a medical concern arise during their admission they may have [facility] contact us.         Uri Weldon, [place]   12/16/2023 11:44 AM

## 2023-12-17 LAB — BACTERIA UR CULT: NORMAL

## 2023-12-17 PROCEDURE — 1240000000 HC EMOTIONAL WELLNESS R&B

## 2023-12-17 PROCEDURE — 6370000000 HC RX 637 (ALT 250 FOR IP)

## 2023-12-17 RX ADMIN — OLANZAPINE 5 MG: 5 TABLET, FILM COATED ORAL at 15:24

## 2023-12-17 RX ADMIN — HYDROXYZINE HYDROCHLORIDE 50 MG: 50 TABLET, FILM COATED ORAL at 20:49

## 2023-12-17 RX ADMIN — HALOPERIDOL 2 MG: 1 TABLET ORAL at 20:49

## 2023-12-17 RX ADMIN — BENZTROPINE MESYLATE 1 MG: 1 TABLET ORAL at 20:49

## 2023-12-17 RX ADMIN — BENZTROPINE MESYLATE 1 MG: 1 TABLET ORAL at 08:42

## 2023-12-17 RX ADMIN — TRAZODONE HYDROCHLORIDE 50 MG: 50 TABLET ORAL at 20:49

## 2023-12-17 RX ADMIN — HYDROXYZINE HYDROCHLORIDE 50 MG: 50 TABLET, FILM COATED ORAL at 12:40

## 2023-12-17 RX ADMIN — HALOPERIDOL 2 MG: 1 TABLET ORAL at 08:42

## 2023-12-17 NOTE — BH NOTE
Requested medication to decrease feelings of anxiousness. Received Atarax 50 mg PO. Encouraged to relax in her room. Darlene Au has been out for meals and to walk the unit. She reports increased anxiousness \"being around a lot of people\".

## 2023-12-17 NOTE — PLAN OF CARE
Regla Lopes has been isolative to her room this evening. She denies SI/HI/AH/VH, currently. States she is here because she thought she was having a seizure. Repetitive movements of lower jaw noted. She is compliant with medications. No signs of distress noted, at this time. Problem: Anxiety  Goal: Will report anxiety at manageable levels  Description: INTERVENTIONS:  1. Administer medication as ordered  2. Teach and rehearse alternative coping skills  3.  Provide emotional support with 1:1 interaction with staff  12/16/2023 2231 by Mayra White RN  Outcome: Progressing  12/16/2023 1216 by Dontae Martinez LPN  Outcome: Progressing  Flowsheets (Taken 12/16/2023 1000)  Will report anxiety at manageable levels:   Administer medication as ordered   Teach and rehearse alternative coping skills   Provide emotional support with 1:1 interaction with staff

## 2023-12-17 NOTE — BH NOTE
Reporting feeling less anxious and that the Atarax helped \"a little\". Pacing the unit. Having increased jaw tremors. Stated she was feeling stressed. Received Zyprexa 5 mg PO to aid in decreasing agitation.

## 2023-12-18 PROCEDURE — 6370000000 HC RX 637 (ALT 250 FOR IP)

## 2023-12-18 PROCEDURE — 1240000000 HC EMOTIONAL WELLNESS R&B

## 2023-12-18 PROCEDURE — 99233 SBSQ HOSP IP/OBS HIGH 50: CPT | Performed by: PSYCHIATRY & NEUROLOGY

## 2023-12-18 RX ADMIN — BENZTROPINE MESYLATE 1 MG: 1 TABLET ORAL at 20:17

## 2023-12-18 RX ADMIN — BENZTROPINE MESYLATE 1 MG: 1 TABLET ORAL at 08:29

## 2023-12-18 RX ADMIN — HALOPERIDOL 2 MG: 1 TABLET ORAL at 20:17

## 2023-12-18 RX ADMIN — HALOPERIDOL 2 MG: 1 TABLET ORAL at 08:28

## 2023-12-18 RX ADMIN — TRAZODONE HYDROCHLORIDE 50 MG: 50 TABLET ORAL at 20:17

## 2023-12-18 RX ADMIN — HYDROXYZINE HYDROCHLORIDE 50 MG: 50 TABLET, FILM COATED ORAL at 20:17

## 2023-12-18 NOTE — PLAN OF CARE
Khadijah Deutsch has been withdrawn to her room the entire shift. She is pleasant during interactions with this writer, but appears to be sleepy and not very eager to converse. Facial involuntary movements not noted. Khadijah Deutsch was compliant with scheduled nightly medications & requested PRN Trazodone 50 mg PO to help with sleep and PRN Atarax 50 mg PO to help with anxiety. Khadijah Deutsch denies SI, HI, and AVH. Problem: Anxiety  Goal: Will report anxiety at manageable levels  Description: INTERVENTIONS:  1. Administer medication as ordered  2. Teach and rehearse alternative coping skills  3. Provide emotional support with 1:1 interaction with staff  Outcome: Progressing     Problem: Coping  Goal: Pt/Family able to verbalize concerns and demonstrate effective coping strategies  Description: INTERVENTIONS:  1. Assist patient/family to identify coping skills, available support systems and cultural and spiritual values  2. Provide emotional support, including active listening and acknowledgement of concerns of patient and caregivers  3. Reduce environmental stimuli, as able  4. Instruct patient/family in relaxation techniques, as appropriate  5. Assess for spiritual pain/suffering and initiate Spiritual Care, Psychosocial Clinical Specialist consults as needed  Outcome: Progressing     Problem: Depression/Self Harm  Goal: Effect of psychiatric condition will be minimized and patient will be protected from self harm  Description: INTERVENTIONS:  1. Assess impact of patient's symptoms on level of functioning, self care needs and offer support as indicated  2. Assess patient/family knowledge of depression, impact on illness and need for teaching  3. Provide emotional support, presence and reassurance  4. Assess for possible suicidal thoughts or ideation. If patient expresses suicidal thoughts or statements do not leave alone, initiate Suicide Precautions, move to a room close to the nursing station and obtain sitter  5.  Initiate consults as appropriate with Mental Health Professional, Spiritual Care, Psychosocial CNS, and consider a recommendation to the LIP for a Psychiatric Consultation  Outcome: Progressing     Problem: Sleep Disturbance  Goal: Will exhibit normal sleeping pattern  Description: INTERVENTIONS:  1. Administer medication as ordered  2. Decrease environmental stimuli, including noise, as appropriate  3.  Discourage social isolation and naps during the day  Outcome: Progressing

## 2023-12-18 NOTE — DISCHARGE INSTRUCTIONS
Advanced Directives:  Patient does not have a surrogate decision maker appointed   Name (if yes): N/A Phone Number: N/A  Patient does not have a psychiatric and/ or medical advanced directive or power of . Patient was offered psychiatric and/ or medical advanced directive or power of  information/completion but declined to complete   Why not? N/A    Discharge Planning is Complete. Discharge Date: 12/21/23   Reason for Hospitalization: Patient is a 34 y.o. female who presents to ED voluntarily after coming in for seizures/shaking and pt states her body is tensing up. Per  notes: \"Pt reports she cannot control her movements and will shake all over. This writer observed pt's jaw shaking constantly during assessment. Pt reports she will pace back and forth. She reports her body hurts and states her muscles will cramp a lot. She reports she either sleeps too much or not enough. Pt reports she has been depressed and feels no one loves her. \"  Discharge Diagnosis: Schizophrenia  Discharging to: Home     Your instructions: Your physician here was Liam Jeff MD. If you have any questions please call the unit at [phone] for the adult unit or [phone] for [facility]. Please note that we have a patient family advisory [place] that meets the second Wednesday of January and the second Wednesday of July at [address] [place] [address] in [place] at [facility]. Department leadership would love for you to attend to give feedback on what we are doing well and areas in which we can improve our patient care. Please come if you are able and feel free to reach out to [address] [place] [address] at [phone] with any questions. The crisis number for [facility] is [address] can use this number at any time to access emergency mental health services. Please follow up with your PCP regarding any pending labs. You are being referred to [facility].  They Additional Area 6 Units: 0 Periorbital Skin Units/Cc: 15 Expiration Date (Month Year): 06/2021 Quantity Per Injection Site (Units Or Cc): 2.5 U Post-Care Instructions: Patient instructed to not lie down for 4 hours and limit physical activity for 24 hours. Patient instructed not to travel by airplane for 48 hours. Dilution (U/0.1 Cc): 5 Lot #: [id number] Quantity Per Injection Site (Units Or Cc): 5 U Reconstitution Date (Optional): 02/19/19 Document As Units Or Cc?: units Consent: Written consent obtained. Risks include but not limited to lid/brow ptosis, bruising, swelling, diplopia, temporary effect, incomplete chemical denervation. Detail Level: Detailed Glabellar Complex Units: 20 Additional Area 1 Location: tips of the eyebrows

## 2023-12-18 NOTE — PROGRESS NOTES
Home Medication Reconciliation Status          [x] COMPLETE       Medication history has been reviewed and obtained from the following source(s):       [] patient/family verbal report             [] patient/family provided written list       [x] external pharmacy   [] external facility list  [facility]-only meds filled through them was Haldol 5 mg. [x]  Provider notified that home medication reconciliation is complete          [] IN PROGRESS       Medication reconciliation marked in progress at this time due to:       [] patient/family poor historian      [] waiting arrival of family to clarify       [] waiting for accurate list        [] external pharmacy needs called      * Follow up is needed. [] UNABLE TO ASSESS       Medication reconciliation is incomplete and unable to assess at this time due to:       [] critical patient condition   [] patient is unresponsive        [] no family available                       [] unknown pharmacy       [] anonymous patient          * Follow up is needed.       [] Pharmacy consult placed for medication reconciliation assistance   Additional comments: up to date

## 2023-12-18 NOTE — GROUP NOTE
Group Therapy Note    Date: 12/18/2023    Group Start Time: 1000  Group End Time: 6564  Group Topic: Psychoeducation    [facility]    ESDRAS Velazquez        Group Therapy Note  Clinician introduced the group to CBT, flipping their lid and mindful/grounding activities. Clinician utilized role play and handouts to keep the group interactive and engaging. Attendees: 11       Patient's Goal:      Notes:  Patient was cooperative and fully engaged in the group discussion, activity and role plays. Patient was able to participate in the discussion and ask clarifying questions. Status After Intervention:  Improved    Participation Level:  Active Listener and Interactive    Participation Quality: Appropriate, Attentive, Sharing, and Supportive      Speech:  normal      Thought Process/Content: Logical      Affective Functioning: Congruent      Mood: euthymic      Level of consciousness:  Attentive      Response to Learning: Able to verbalize current knowledge/experience      Endings: None Reported    Modes of Intervention: Education, Support, Exploration, and Activity      Discipline Responsible: /Counselor      Signature:  ESDRAS Velazquez

## 2023-12-18 NOTE — PLAN OF CARE
Problem: Anxiety  Goal: Will report anxiety at manageable levels  Description: INTERVENTIONS:  1. Administer medication as ordered  2. Teach and rehearse alternative coping skills  3. Provide emotional support with 1:1 interaction with staff  12/18/2023 1210 by Megan Montana RN  Outcome: Progressing     Problem: Coping  Goal: Pt/Family able to verbalize concerns and demonstrate effective coping strategies  Description: INTERVENTIONS:  1. Assist patient/family to identify coping skills, available support systems and cultural and spiritual values  2. Provide emotional support, including active listening and acknowledgement of concerns of patient and caregivers  3. Reduce environmental stimuli, as able  4. Instruct patient/family in relaxation techniques, as appropriate  5. Assess for spiritual pain/suffering and initiate Spiritual Care, Psychosocial Clinical Specialist consults as needed  12/18/2023 1210 by Megan Montana RN  Outcome: Progressing     Problem: Depression/Self Harm  Goal: Effect of psychiatric condition will be minimized and patient will be protected from self harm  Description: INTERVENTIONS:  1. Assess impact of patient's symptoms on level of functioning, self care needs and offer support as indicated  2. Assess patient/family knowledge of depression, impact on illness and need for teaching  3. Provide emotional support, presence and reassurance  4. Assess for possible suicidal thoughts or ideation. If patient expresses suicidal thoughts or statements do not leave alone, initiate Suicide Precautions, move to a room close to the nursing station and obtain sitter  5.  Initiate consults as appropriate with Mental Health Professional, Spiritual Care, Psychosocial CNS, and consider a recommendation to the LIP for a Psychiatric Consultation  12/18/2023 1210 by Megan Montana RN  Outcome: Progressing     Problem: Sleep Disturbance  Goal: Will exhibit normal sleeping pattern  Description:

## 2023-12-19 PROCEDURE — 6370000000 HC RX 637 (ALT 250 FOR IP)

## 2023-12-19 PROCEDURE — 1240000000 HC EMOTIONAL WELLNESS R&B

## 2023-12-19 PROCEDURE — 99233 SBSQ HOSP IP/OBS HIGH 50: CPT | Performed by: PSYCHIATRY & NEUROLOGY

## 2023-12-19 RX ADMIN — BENZTROPINE MESYLATE 1 MG: 1 TABLET ORAL at 09:58

## 2023-12-19 RX ADMIN — HALOPERIDOL 2 MG: 1 TABLET ORAL at 09:58

## 2023-12-19 NOTE — PLAN OF CARE
Darlene Au has been withdrawn to her room most of the shift; she has only been out of her room to get snacks. This writer noted that her facial expressions & replies in conversation seemed a little exaggerated, like she was overly agreeable and very eager to take medications. Darlene Au was compliant with her scheduled nightly medications & requested PRN Atarax 50 mg PO for anxiety and PRN Trazodone 50 mg PO to help with sleep. This writer did not note any involuntary facial movements. Darlene Au denies SI, HI, and AVH. Problem: Anxiety  Goal: Will report anxiety at manageable levels  Description: INTERVENTIONS:  1. Administer medication as ordered  2. Teach and rehearse alternative coping skills  3. Provide emotional support with 1:1 interaction with staff  12/18/2023 2032 by Leroy Ball RN  Outcome: Progressing     Problem: Coping  Goal: Pt/Family able to verbalize concerns and demonstrate effective coping strategies  Description: INTERVENTIONS:  1. Assist patient/family to identify coping skills, available support systems and cultural and spiritual values  2. Provide emotional support, including active listening and acknowledgement of concerns of patient and caregivers  3. Reduce environmental stimuli, as able  4. Instruct patient/family in relaxation techniques, as appropriate  5. Assess for spiritual pain/suffering and initiate Spiritual Care, Psychosocial Clinical Specialist consults as needed  12/18/2023 2032 by Leroy Ball RN  Outcome: Progressing     Problem: Depression/Self Harm  Goal: Effect of psychiatric condition will be minimized and patient will be protected from self harm  Description: INTERVENTIONS:  1. Assess impact of patient's symptoms on level of functioning, self care needs and offer support as indicated  2. Assess patient/family knowledge of depression, impact on illness and need for teaching  3. Provide emotional support, presence and reassurance  4.  Assess for possible suicidal thoughts or

## 2023-12-19 NOTE — PLAN OF CARE
Problem: Anxiety  Goal: Will report anxiety at manageable levels  Description: INTERVENTIONS:  1. Administer medication as ordered  2. Teach and rehearse alternative coping skills  3. Provide emotional support with 1:1 interaction with staff  Outcome: Progressing     Problem: Depression/Self Harm  Goal: Effect of psychiatric condition will be minimized and patient will be protected from self harm  Description: INTERVENTIONS:  1. Assess impact of patient's symptoms on level of functioning, self care needs and offer support as indicated  2. Assess patient/family knowledge of depression, impact on illness and need for teaching  3. Provide emotional support, presence and reassurance  4. Assess for possible suicidal thoughts or ideation. If patient expresses suicidal thoughts or statements do not leave alone, initiate Suicide Precautions, move to a room close to the nursing station and obtain sitter  5. Initiate consults as appropriate with Mental Health Professional, Spiritual Care, Psychosocial CNS, and consider a recommendation to the LIP for a Psychiatric Consultation  Outcome: Progressing     Problem: Sleep Disturbance  Goal: Will exhibit normal sleeping pattern  Description: INTERVENTIONS:  1. Administer medication as ordered  2. Decrease environmental stimuli, including noise, as appropriate  3.  Discourage social isolation and naps during the day  Outcome: Progressing

## 2023-12-20 PROCEDURE — 99233 SBSQ HOSP IP/OBS HIGH 50: CPT | Performed by: PSYCHIATRY & NEUROLOGY

## 2023-12-20 PROCEDURE — 1240000000 HC EMOTIONAL WELLNESS R&B

## 2023-12-20 NOTE — BH NOTE
Pt withdrawn to room sleeping except for meals today. Able to make needs known. Denies si/hi/AVH. Cooperative. Encouraged to shower, self care, supplies provided.

## 2023-12-20 NOTE — PROGRESS NOTES
Patient's visitor handed this writer a ring when he was walked out and asked that it be placed in the patient's belongings. Patient aware. Ring placed in safe.

## 2023-12-20 NOTE — PLAN OF CARE
[facility]  Treatment Team Note  Review Date & Time: 12/20/23  0295    Patient was not in treatment team      Status EXAM:   Mental Status and Behavioral Exam  Normal: No  Level of Assistance: Independent/Self  Facial Expression: Flat  Affect: Congruent  Level of Consciousness: Alert  Frequency of Checks: 4 times per hour, close  Mood:Normal: No  Mood: Depressed  Motor Activity:Normal: No  Motor Activity: Decreased  Eye Contact: Fair  Observed Behavior: Cooperative, Friendly, Withdrawn  Sexual Misconduct History: Current - no  Preception: [place] to person, [place] to time, [place] to place, [place] to situation  Attention:Normal: Yes  Attention: Distractible  Thought Processes: Circumstantial  Thought Content:Normal: Yes  Thought Content: Paranoia  Depression Symptoms: Change in energy level, Isolative  Anxiety Symptoms: Generalized  [place] Symptoms: No problems reported or observed. Hallucinations: None  Delusions: No  Memory:Normal: Yes  Memory: Poor remote  Insight and Judgment: No  Insight and Judgment: Poor judgment, Poor insight      Suicide Risk CSSR-S:  1) Within the past month, have you wished you were dead or wished you could go to sleep and not wake up? : Yes  2) Have you actually had any thoughts of killing yourself? : No  6) Have you ever done anything, started to do anything, or prepared to do anything to end your life?: Yes      PLAN/TREATMENT RECOMMENDATIONS UPDATE:   Patient will take medication as prescribed, eat 75% of meals, attend groups, participate in milieu activities, participate in treatment team and care planning for discharge and follow up.            Salud Lozano RN

## 2023-12-20 NOTE — PLAN OF CARE
Pt is independent, A/Ox4, and has been visible on the unit this shift. Pt has been cooperative and friendly but withdrawn. Pt denies all SI/HI/AVH.         Problem: Anxiety  Goal: Will report anxiety at manageable levels  Outcome: Progressing     Problem: Coping  Goal: Pt/Family able to verbalize concerns and demonstrate effective coping strategies  Outcome: Progressing     Problem: Depression/Self Harm  Goal: Effect of psychiatric condition will be minimized and patient will be protected from self harm  Outcome: Progressing     Problem: Sleep Disturbance  Goal: Will exhibit normal sleeping pattern  Outcome: Progressing

## 2023-12-20 NOTE — PLAN OF CARE
Problem: Anxiety  Goal: Will report anxiety at manageable levels  Description: INTERVENTIONS:  1. Administer medication as ordered  2. Teach and rehearse alternative coping skills  3. Provide emotional support with 1:1 interaction with staff  12/20/2023 1240 by Rock Julianna RN  Outcome: Progressing  12/19/2023 2253 by Jsesie Vega RN  Outcome: Progressing     Problem: Coping  Goal: Pt/Family able to verbalize concerns and demonstrate effective coping strategies  Description: INTERVENTIONS:  1. Assist patient/family to identify coping skills, available support systems and cultural and spiritual values  2. Provide emotional support, including active listening and acknowledgement of concerns of patient and caregivers  3. Reduce environmental stimuli, as able  4. Instruct patient/family in relaxation techniques, as appropriate  5. Assess for spiritual pain/suffering and initiate Spiritual Care, Psychosocial Clinical Specialist consults as needed  12/20/2023 1240 by Rock Julianna RN  Outcome: Progressing  12/19/2023 2253 by Jessie Vega RN  Outcome: Progressing     Problem: Depression/Self Harm  Goal: Effect of psychiatric condition will be minimized and patient will be protected from self harm  Description: INTERVENTIONS:  1. Assess impact of patient's symptoms on level of functioning, self care needs and offer support as indicated  2. Assess patient/family knowledge of depression, impact on illness and need for teaching  3. Provide emotional support, presence and reassurance  4. Assess for possible suicidal thoughts or ideation. If patient expresses suicidal thoughts or statements do not leave alone, initiate Suicide Precautions, move to a room close to the nursing station and obtain sitter  5.  Initiate consults as appropriate with Mental Health Professional, Spiritual Care, Psychosocial CNS, and consider a recommendation to the LIP for a Psychiatric Consultation  12/20/2023 1240 by Mu Rosales

## 2023-12-21 VITALS
DIASTOLIC BLOOD PRESSURE: 66 MMHG | RESPIRATION RATE: 16 BRPM | SYSTOLIC BLOOD PRESSURE: 100 MMHG | OXYGEN SATURATION: 98 % | BODY MASS INDEX: 23.93 KG/M2 | HEIGHT: 58 IN | HEART RATE: 72 BPM | TEMPERATURE: 97.1 F | WEIGHT: 114 LBS

## 2023-12-21 PROCEDURE — 99239 HOSP IP/OBS DSCHRG MGMT >30: CPT | Performed by: PSYCHIATRY & NEUROLOGY

## 2023-12-21 PROCEDURE — 5130000000 HC BRIDGE APPOINTMENT

## 2023-12-21 NOTE — FLOWSHEET NOTE
12/21/23 1217   C-SSRS Suicide Frequent Screening   2) Since you were last asked, have you actually had thoughts about killing yourself? No   6) Since you were last asked, have you done anything, started to do anything, or prepared to do anything to end your life?  No   Risk of Suicide No Risk

## 2023-12-21 NOTE — PLAN OF CARE
Patient denies SI/HI/AH/VH, currently. She is isolative to room, out for snacks. Problem: Anxiety  Goal: Will report anxiety at manageable levels  Description: INTERVENTIONS:  1. Administer medication as ordered  2. Teach and rehearse alternative coping skills  3. Provide emotional support with 1:1 interaction with staff  12/20/2023 2243 by Yohan Garcia RN  Outcome: Progressing  Flowsheets (Taken 12/20/2023 1243 by Leobardo Cardona RN)  Will report anxiety at manageable levels:   Teach and rehearse alternative coping skills   Provide emotional support with 1:1 interaction with staff  12/20/2023 1240 by Leobardo Cardona RN  Outcome: Progressing     Problem: Sleep Disturbance  Goal: Will exhibit normal sleeping pattern  Description: INTERVENTIONS:  1. Administer medication as ordered  2. Decrease environmental stimuli, including noise, as appropriate  3.  Discourage social isolation and naps during the day  12/20/2023 2243 by Yohan Garcia RN  Outcome: Progressing  12/20/2023 1240 by Leobardo Cardona RN  Outcome: Progressing

## 2023-12-21 NOTE — BH NOTE
[facility]  Discharge Note    Pt discharged with followings belongings:   Dental Appliances: None  Vision - Corrective Lenses: Eyeglasses  Hearing Aid: None  Jewelry: Necklace, Bracelet, Ring (3 bracelets, 1 necklace)  Body Piercings Removed: N/A  Clothing: Pants, Shirt, Footwear, Undergarments, Jacket/Coat, Hat, Belt (1 pair pants, 1 shirt, 1 pair boots, 1 sports bra, 1 pair boxer underwear,1 jacket, 1 belt, 1 hat)  Other Valuables: Other (Comment) (none)   Valuables returned to patient. Patient educated on aftercare instructions: yes  Information and paper prescription faxed to [facility] by Lauren Kimble RN  at 12:28 PM .Patient verbalize understanding of AVS:  yes. Status EXAM upon discharge:  Mental Status and Behavioral Exam  Normal: No  Level of Assistance: Independent/Self  Facial Expression: Flat  Affect: Congruent  Level of Consciousness: Alert  Frequency of Checks: 4 times per hour, close  Mood:Normal: Yes  Mood: Depressed  Motor Activity:Normal: No  Motor Activity: Tremors (lip tremor)  Eye Contact: Good  Observed Behavior: Withdrawn, Cooperative  Sexual Misconduct History: Current - no  Preception: [place] to person, [place] to time, [place] to place, [place] to situation  Attention:Normal: Yes  Attention: Distractible  Thought Processes: Unremarkable  Thought Content:Normal: Yes  Thought Content: Paranoia  Depression Symptoms: Sleep disturbance, Change in energy level  Anxiety Symptoms: Generalized  [place] Symptoms: No problems reported or observed.   Hallucinations: None  Delusions: No  Memory:Normal: No  Memory: Poor recent  Insight and Judgment: No  Insight and Judgment: Poor judgment, Poor insight    Tobacco Screening:  Practical Counseling, on admission, kareem X, if applicable and completed (first 3 are required if patient doesn't refuse):            ( ) Recognizing danger situations (included triggers and roadblocks)                    ( ) Coping skills (new ways to manage stress,relaxation techniques, changing routine, distraction)                                                           ( ) Basic information about quitting (benefits of quitting, techniques in how to quit, available resources  ( ) Referral for counseling faxed to [address] [place] [address]                                                                                                                   ( ) Patient refused counseling  ( ) Patient refused referral  ( ) Patient refused prescription upon discharge  ( x) Patient has not smoked in the last 30 days    Metabolic Screening:    Lab Results   Component Value Date    LABA1C 5.2 03/26/2023       Lab Results   Component Value Date    CHOL 164 03/26/2023     Lab Results   Component Value Date    TRIG 64 03/26/2023     Lab Results   Component Value Date    HDL 58 03/26/2023     No components found for: \"LDLCAL\"  Lab Results   Component Value Date    LABVLDL 13 03/26/2023       Bridge Appointment completed: Reviewed Discharge Instructions with patient. Patient verbalizes understanding and agreement with the discharge plan using the teachback method.        Vaccinations (kareem X if applicable and completed):  ( ) Patient states already received influenza vaccine elsewhere  ( ) Patient received influenza vaccine during this hospitalization  ( x) Patient refused influenza vaccine at this time  ( ) Not offered

## 2023-12-22 NOTE — DISCHARGE SUMMARY
Comment: Pediatric calculator link  Susie.at. org/professionals/kdoqi/gfr_calculatorped  Effective Oct 3, 2022  These results are not intended for use in patients  <25years of age. eGFR results are calculated without  a race factor using the 2021 CKD-EPI equation. Careful  clinical correlation is recommended, particularly when  comparing to results calculated using previous equations. The CKD-EPI equation is less accurate in patients with  extremes of muscle mass, extra-renal metabolism of  creatinine, excessive creatinine ingestion, or following  therapy that affects renal tubular secretion. Calcium 12/15/2023 9.1  8.3 - 10.6 mg/dL Final    Total Protein 12/15/2023 7.5  6.4 - 8.2 g/dL Final    Albumin 12/15/2023 4.7  3.4 - 5.0 g/dL Final    Albumin/Globulin Ratio 12/15/2023 1.7  1.1 - 2.2 Final    Total Bilirubin 12/15/2023 <0.2  0.0 - 1.0 mg/dL Final    Alkaline Phosphatase 12/15/2023 63  40 - 129 U/L Final    ALT 12/15/2023 16  10 - 40 U/L Final    Comment: Specimen hemolysis has exceeded the interference as defined by Roche. Result may be affected. Suggest reorder and recollection if clinically  indicated. AST 12/15/2023 32  15 - 37 U/L Final    Comment: Specimen hemolysis has exceeded the interference as defined by Roche. Value may be falsely increased. Suggest reorder and recollection if  clinically indicated.       Color, UA 12/15/2023 Yellow  Straw/Yellow Final    Clarity, UA 12/15/2023 Clear  Clear Final    Glucose, Ur 12/15/2023 Negative  Negative mg/dL Final    Bilirubin Urine 12/15/2023 Negative  Negative Final    Ketones, Urine 12/15/2023 Negative  Negative mg/dL Final    Specific Gravity, UA 12/15/2023 1.025  1.005 - 1.030 Final    Blood, Urine 12/15/2023 Negative  Negative Final    pH, UA 12/15/2023 6.0  5.0 - 8.0 Final    Protein, UA 12/15/2023 Negative  Negative mg/dL Final    Urobilinogen, Urine 12/15/2023 0.2  <2.0 E.U./dL Final    Nitrite, Urine 12/15/2023 Negative Salicylate, Serum 21/49/7843 1.6 (L)  15.0 - 30.0 mg/dL Final    Comment: Therapeutic Range: 15.0-30.0 mg/dL  Toxic: >30.0 mg/dL      Lactic Acid, Sepsis 12/15/2023 1.4  0.4 - 1.9 mmol/L Final    Lactic Acid, Sepsis 12/15/2023 0.7  0.4 - 1.9 mmol/L Final    Procalcitonin 12/15/2023 0.02  0.00 - 0.15 ng/mL Final    Comment: Suspected Sepsis:  Low likelihood of sepsis  <.50 ng/mL    Increased likelihood of sepsis 0.50-2.00 ng/mL  Antibiotics encouraged    High risk of sepsis/shock   >2.00 ng/mL  Antibiotics strongly encouraged    Suspected Lower Respiratory Tract Infections:  Low likelihood of bacterial infection  <0.24 ng/mL    Increased likelihood of bacterial infection >0.24 ng/mL  Antibiotics encouraged    With successful antibiotic therapy, PCT levels should decrease  rapidly. (Half-life of 24 to 36 hours.)    Procalcitonin values from samples collected within the first  6 hours of systemic infection may still be low. Retesting may be indicated. Values from day 1 and day 4 can be entered into the Change in  Procalcitonin Calculator to determine the patient's  Mortality Risk Prognosis  (www.[facility]-pct-calculator. com)    In healthy neonates, plasma Procalcitonin (PCT) concentrations  increase gradually after birth, reaching peak values at about  24 hours of age then decrease to normal values below 0.5                            ng/mL  by 48-72 hours of age. Mucus, UA 12/15/2023 2+ (A)  None Seen /LPF Final    WBC, UA 12/15/2023 3-5  0 - 5 /HPF Final    RBC, UA 12/15/2023 3-4  0 - 4 /HPF Final    Epithelial Cells, UA 12/15/2023 6-10 (A)  0 - 5 /HPF Final    Renal Epithelial, UA 12/15/2023 0-1  0 - 1 /HPF Corrected    Corrected result; previously reported as 2-5 on 12/15/2023 at 14:00 by [facility]    hCG Qual 12/15/2023 Negative  Detects HCG level >10 MIU/mL Final    Urine Culture, Routine 12/15/2023 <50,000 CFU/ml mixed skin/urogenital yamilet.  No further workup   Final        Mental Status Exam at

## 2023-12-26 ENCOUNTER — FOLLOWUP TELEPHONE ENCOUNTER (OUTPATIENT)
Dept: PSYCHIATRY | Age: 33
End: 2023-12-26

## 2023-12-28 ENCOUNTER — FOLLOWUP TELEPHONE ENCOUNTER (OUTPATIENT)
Dept: PSYCHIATRY | Age: 33
End: 2023-12-28